# Patient Record
Sex: FEMALE | Race: BLACK OR AFRICAN AMERICAN | Employment: UNEMPLOYED | URBAN - METROPOLITAN AREA
[De-identification: names, ages, dates, MRNs, and addresses within clinical notes are randomized per-mention and may not be internally consistent; named-entity substitution may affect disease eponyms.]

---

## 2017-07-03 ENCOUNTER — OFFICE VISIT (OUTPATIENT)
Dept: OPTOMETRY | Facility: CLINIC | Age: 62
End: 2017-07-03
Payer: COMMERCIAL

## 2017-07-03 ENCOUNTER — OFFICE VISIT (OUTPATIENT)
Dept: FAMILY MEDICINE | Facility: CLINIC | Age: 62
End: 2017-07-03
Payer: COMMERCIAL

## 2017-07-03 VITALS
SYSTOLIC BLOOD PRESSURE: 168 MMHG | OXYGEN SATURATION: 98 % | DIASTOLIC BLOOD PRESSURE: 99 MMHG | BODY MASS INDEX: 28.76 KG/M2 | HEART RATE: 53 BPM | WEIGHT: 137 LBS | TEMPERATURE: 98.6 F | HEIGHT: 58 IN

## 2017-07-03 DIAGNOSIS — R35.0 URINARY FREQUENCY: ICD-10-CM

## 2017-07-03 DIAGNOSIS — I10 ESSENTIAL HYPERTENSION WITH GOAL BLOOD PRESSURE LESS THAN 140/90: Primary | ICD-10-CM

## 2017-07-03 DIAGNOSIS — Z12.11 SCREEN FOR COLON CANCER: ICD-10-CM

## 2017-07-03 DIAGNOSIS — Z11.59 NEED FOR HEPATITIS C SCREENING TEST: ICD-10-CM

## 2017-07-03 DIAGNOSIS — H52.03 HYPEROPIA, BILATERAL: ICD-10-CM

## 2017-07-03 DIAGNOSIS — Z96.1 PSEUDOPHAKIA OF BOTH EYES: ICD-10-CM

## 2017-07-03 DIAGNOSIS — E55.9 VITAMIN D DEFICIENCY: ICD-10-CM

## 2017-07-03 DIAGNOSIS — Z12.4 SCREENING FOR MALIGNANT NEOPLASM OF CERVIX: ICD-10-CM

## 2017-07-03 DIAGNOSIS — H52.4 PRESBYOPIA: ICD-10-CM

## 2017-07-03 DIAGNOSIS — E78.5 HYPERLIPIDEMIA LDL GOAL <130: ICD-10-CM

## 2017-07-03 DIAGNOSIS — H26.491 POSTERIOR CAPSULE OPACIFICATION, RIGHT: ICD-10-CM

## 2017-07-03 DIAGNOSIS — H52.223 REGULAR ASTIGMATISM OF BOTH EYES: ICD-10-CM

## 2017-07-03 DIAGNOSIS — E55.9 VITAMIN D DEFICIENCY DISEASE: ICD-10-CM

## 2017-07-03 DIAGNOSIS — H04.123 DRY EYE SYNDROME, BILATERAL: Primary | ICD-10-CM

## 2017-07-03 DIAGNOSIS — H40.003 GLAUCOMA SUSPECT, BILATERAL: ICD-10-CM

## 2017-07-03 DIAGNOSIS — Z12.31 VISIT FOR SCREENING MAMMOGRAM: ICD-10-CM

## 2017-07-03 DIAGNOSIS — Z00.01 ENCOUNTER FOR ROUTINE ADULT PHYSICAL EXAM WITH ABNORMAL FINDINGS: ICD-10-CM

## 2017-07-03 LAB
ALBUMIN SERPL-MCNC: 3.9 G/DL (ref 3.4–5)
ALBUMIN UR-MCNC: NEGATIVE MG/DL
ANION GAP SERPL CALCULATED.3IONS-SCNC: 9 MMOL/L (ref 3–14)
APPEARANCE UR: CLEAR
BILIRUB UR QL STRIP: NEGATIVE
BUN SERPL-MCNC: 9 MG/DL (ref 7–30)
CALCIUM SERPL-MCNC: 9.2 MG/DL (ref 8.5–10.1)
CHLORIDE SERPL-SCNC: 103 MMOL/L (ref 94–109)
CHOLEST SERPL-MCNC: 295 MG/DL
CO2 SERPL-SCNC: 28 MMOL/L (ref 20–32)
COLOR UR AUTO: YELLOW
CREAT SERPL-MCNC: 0.66 MG/DL (ref 0.52–1.04)
CREAT UR-MCNC: 90 MG/DL
ERYTHROCYTE [DISTWIDTH] IN BLOOD BY AUTOMATED COUNT: 13 % (ref 10–15)
GFR SERPL CREATININE-BSD FRML MDRD: NORMAL ML/MIN/1.7M2
GLUCOSE SERPL-MCNC: 86 MG/DL (ref 70–99)
GLUCOSE UR STRIP-MCNC: NEGATIVE MG/DL
HCT VFR BLD AUTO: 39.1 % (ref 35–47)
HDLC SERPL-MCNC: 69 MG/DL
HGB BLD-MCNC: 13.3 G/DL (ref 11.7–15.7)
HGB UR QL STRIP: NEGATIVE
KETONES UR STRIP-MCNC: NEGATIVE MG/DL
LDLC SERPL CALC-MCNC: 196 MG/DL
LEUKOCYTE ESTERASE UR QL STRIP: NEGATIVE
MCH RBC QN AUTO: 27.9 PG (ref 26.5–33)
MCHC RBC AUTO-ENTMCNC: 34 G/DL (ref 31.5–36.5)
MCV RBC AUTO: 82 FL (ref 78–100)
MICROALBUMIN UR-MCNC: 5 MG/L
MICROALBUMIN/CREAT UR: 5.93 MG/G CR (ref 0–25)
NITRATE UR QL: NEGATIVE
NONHDLC SERPL-MCNC: 226 MG/DL
PH UR STRIP: 8 PH (ref 5–7)
PHOSPHATE SERPL-MCNC: 3 MG/DL (ref 2.5–4.5)
PLATELET # BLD AUTO: 183 10E9/L (ref 150–450)
POTASSIUM SERPL-SCNC: 3.8 MMOL/L (ref 3.4–5.3)
RBC # BLD AUTO: 4.77 10E12/L (ref 3.8–5.2)
SODIUM SERPL-SCNC: 140 MMOL/L (ref 133–144)
SP GR UR STRIP: 1.01 (ref 1–1.03)
TRIGL SERPL-MCNC: 148 MG/DL
URN SPEC COLLECT METH UR: ABNORMAL
UROBILINOGEN UR STRIP-ACNC: 0.2 EU/DL (ref 0.2–1)
WBC # BLD AUTO: 6 10E9/L (ref 4–11)

## 2017-07-03 PROCEDURE — 81003 URINALYSIS AUTO W/O SCOPE: CPT | Performed by: FAMILY MEDICINE

## 2017-07-03 PROCEDURE — 86803 HEPATITIS C AB TEST: CPT | Performed by: FAMILY MEDICINE

## 2017-07-03 PROCEDURE — 92015 DETERMINE REFRACTIVE STATE: CPT | Performed by: OPTOMETRIST

## 2017-07-03 PROCEDURE — 92004 COMPRE OPH EXAM NEW PT 1/>: CPT | Performed by: OPTOMETRIST

## 2017-07-03 PROCEDURE — 85027 COMPLETE CBC AUTOMATED: CPT | Performed by: FAMILY MEDICINE

## 2017-07-03 PROCEDURE — 80069 RENAL FUNCTION PANEL: CPT | Performed by: FAMILY MEDICINE

## 2017-07-03 PROCEDURE — 80061 LIPID PANEL: CPT | Performed by: FAMILY MEDICINE

## 2017-07-03 PROCEDURE — 99396 PREV VISIT EST AGE 40-64: CPT | Performed by: FAMILY MEDICINE

## 2017-07-03 PROCEDURE — 82043 UR ALBUMIN QUANTITATIVE: CPT | Performed by: FAMILY MEDICINE

## 2017-07-03 PROCEDURE — 82306 VITAMIN D 25 HYDROXY: CPT | Performed by: FAMILY MEDICINE

## 2017-07-03 PROCEDURE — 36415 COLL VENOUS BLD VENIPUNCTURE: CPT | Performed by: FAMILY MEDICINE

## 2017-07-03 RX ORDER — OXYBUTYNIN CHLORIDE 10 MG/1
10 TABLET, EXTENDED RELEASE ORAL DAILY
Qty: 90 TABLET | Refills: 3 | Status: SHIPPED | OUTPATIENT
Start: 2017-07-03 | End: 2018-08-07

## 2017-07-03 RX ORDER — ERGOCALCIFEROL 1.25 MG/1
50000 CAPSULE, LIQUID FILLED ORAL
Qty: 8 CAPSULE | Refills: 0 | Status: SHIPPED | OUTPATIENT
Start: 2017-07-03 | End: 2017-09-05

## 2017-07-03 RX ORDER — AMLODIPINE BESYLATE 10 MG/1
10 TABLET ORAL DAILY
Qty: 90 TABLET | Refills: 3 | Status: SHIPPED | OUTPATIENT
Start: 2017-07-03 | End: 2018-08-07

## 2017-07-03 RX ORDER — ATORVASTATIN CALCIUM 40 MG/1
40 TABLET, FILM COATED ORAL DAILY
Qty: 90 TABLET | Refills: 3 | Status: SHIPPED | OUTPATIENT
Start: 2017-07-03 | End: 2018-08-07

## 2017-07-03 ASSESSMENT — TEAR MENISCUS
OS_TEAR_MENISCUS: DECREASED
OD_TEAR_MENISCUS: DECREASED

## 2017-07-03 ASSESSMENT — REFRACTION_MANIFEST
OS_SPHERE: -1.25
OD_ADD: +2.50
OS_AXIS: 175
OS_ADD: +2.50
OS_CYLINDER: +1.75
OD_SPHERE: PLANO
OD_AXIS: 004
OD_CYLINDER: +1.25

## 2017-07-03 ASSESSMENT — SLIT LAMP EXAM - LIDS
COMMENTS: NORMAL
COMMENTS: NORMAL

## 2017-07-03 ASSESSMENT — VISUAL ACUITY
OS_CC: 20/60
CORRECTION_TYPE: GLASSES
OS_CC: 20/50
OD_CC: 20/60
METHOD: SNELLEN - LINEAR
OD_CC: 20/50
OS_SC: 20/50
OD_SC: 20/50

## 2017-07-03 ASSESSMENT — CONF VISUAL FIELD
OD_NORMAL: 1
OS_NORMAL: 1
METHOD: COUNTING FINGERS

## 2017-07-03 ASSESSMENT — PUNCTA - ASSESSMENT
OS_PUNCTA: NORMAL
OD_PUNCTA: NORMAL

## 2017-07-03 ASSESSMENT — PAIN SCALES - GENERAL: PAINLEVEL: NO PAIN (0)

## 2017-07-03 ASSESSMENT — TONOMETRY
OS_IOP_MMHG: UNAB
OD_IOP_MMHG: UNAB
IOP_UNABLETOASSESS: 1
IOP_METHOD: APPLANATION

## 2017-07-03 ASSESSMENT — REFRACTION_WEARINGRX
OS_SPHERE: -1.75
SPECS_TYPE: BIFOCAL
OS_ADD: +2.75
OS_CYLINDER: +1.75
OS_AXIS: 175
OD_SPHERE: -0.50
OD_CYLINDER: +1.00
OD_AXIS: 180
OD_ADD: +2.75

## 2017-07-03 ASSESSMENT — EXTERNAL EXAM - LEFT EYE: OS_EXAM: NORMAL

## 2017-07-03 ASSESSMENT — CUP TO DISC RATIO
OS_RATIO: 0.7
OD_RATIO: 0.8

## 2017-07-03 ASSESSMENT — EXTERNAL EXAM - RIGHT EYE: OD_EXAM: NORMAL

## 2017-07-03 NOTE — MR AVS SNAPSHOT
After Visit Summary   7/3/2017    Allison Kerr    MRN: [id number]           Patient Information     Date Of Birth          1955        Visit Information        Provider Department      7/3/2017 10:40 AM Elena Jorgensen MD [facility]        Today's Diagnoses     Essential hypertension with goal blood pressure less than 140/90    -  1    Screen for colon cancer        Visit for screening mammogram        Screening for malignant neoplasm of cervix        Need for hepatitis C screening test        Hyperlipidemia LDL goal <130        Vitamin D deficiency        Urinary frequency          Care Instructions    How to contact your care team: [phone] Pharmacy [phone]   PATRIZIA ESPINOZA MD KATYA GEORGIEV, PA-C CHRIS JONES, PA-C NAM HO, MD JONATHAN BATES, MD ARVIN VOCAL, MD    Clinic hours M-Th 7am-7pm Fri 7am-5pm.   Urgent care M-F 11am-9pm  Sat/Sun 9am-5pm.   Pharmacy   Mon-Th:  8:00am-8pm   Fri:  8:00am-6:00pm  Sat/Sun  8:00am-5:00 pm   You are due for your annual mammogram. Please call and schedule your appointment at a time and location that is good for you.    [facility] is available for screening mammographs every other Monday 4:15-5:15, Tuesday 8-10:45, Wednesday 11-12, Friday 3:15-4:15, and every other Saturday 9:15-12:30.    [address] [place] [address]  [phone]   24 hour Mammography scheduling  [phone]    [facility] is available M,W, Th 7:15 am to 5 pm, Tuesday 7:15 am to 4 pm, and Fridays 7:15 to 4:30 pm.     [facility]  [address] [place] [address]  [phone]     Preventive Health Recommendations  Female Ages 50 - 64    Yearly exam: See your health care provider every year in order to  o Review health changes.   o Discuss preventive care.    o Review your medicines if your doctor has prescribed  any.      Get a Pap test every three years (unless you have an abnormal result and your provider advises testing more often).    If you get Pap tests with HPV test, you only need to test every 5 years, unless you have an abnormal result.     You do not need a Pap test if your uterus was removed (hysterectomy) and you have not had cancer.    You should be tested each year for STDs (sexually transmitted diseases) if you're at risk.     Have a mammogram every 1 to 2 years.    Have a colonoscopy at age 50, or have a yearly FIT test (stool test). These exams screen for colon cancer.      Have a cholesterol test every 5 years, or more often if advised.    Have a diabetes test (fasting glucose) every three years. If you are at risk for diabetes, you should have this test more often.     If you are at risk for osteoporosis (brittle bone disease), think about having a bone density scan (DEXA).    Shots: Get a flu shot each year. Get a tetanus shot every 10 years.    Nutrition:     Eat at least 5 servings of fruits and vegetables each day.    Eat whole-grain bread, whole-wheat pasta and brown rice instead of white grains and rice.    Talk to your provider about Calcium and Vitamin D.     Lifestyle    Exercise at least 150 minutes a week (30 minutes a day, 5 days a week). This will help you control your weight and prevent disease.    Limit alcohol to one drink per day.    No smoking.     Wear sunscreen to prevent skin cancer.     See your dentist every six months for an exam and cleaning.    See your eye doctor every 1 to 2 years.            Follow-ups after your visit        Follow-up notes from your care team     Return in about 4 weeks (around 7/31/2017) for Pap, Mammogram.      Future tests that were ordered for you today     Open Future Orders        Priority Expected Expires Ordered    MA SCREENING DIGITAL BILAT - Future  (s+30) Routine  7/3/2018 7/3/2017    Fecal colorectal cancer screen FIT - Future (S+30) Routine  "2017 2017 7/3/2017            Who to contact     If you have questions or need follow up information about today's clinic visit or your schedule please contact [facility] SOWMYA PETIT directly at [phone].  Normal or non-critical lab and imaging results will be communicated to you by MyChart, letter or phone within 4 business days after the clinic has received the results. If you do not hear from us within 7 days, please contact the clinic through [employer] or phone. If you have a critical or abnormal lab result, we will notify you by phone as soon as possible.  Submit refill requests through [employer] or call your pharmacy and they will forward the refill request to us. Please allow 3 business days for your refill to be completed.          Additional Information About Your Visit        [employer] Information     [employer] lets you send messages to your doctor, view your test results, renew your prescriptions, schedule appointments and more. To sign up, go to [email or web address] . Click on \"Log in\" on the left side of the screen, which will take you to the Welcome page. Then click on \"Sign up Now\" on the right side of the page.     You will be asked to enter the access code listed below, as well as some personal information. Please follow the directions to create your username and password.     Your access code is: [email or web address]  Expires: 10/1/2017 11:12 AM     Your access code will  in 90 days. If you need help or a new code, please call your [place] clinic or [phone].        Care EveryWhere ID     This is your Care EveryWhere ID. This could be used by other organizations to access your [place] medical records  [id number]        Your Vitals Were     Pulse Temperature Height Pulse Oximetry Breastfeeding? BMI (Body Mass Index)    53 98.6  F (37  C) (Oral) 4' 10.25\" (1.48 m) 98% No 28.39 kg/m2       Blood Pressure from Last 3 Encounters:   17 (!) 168/99   16 152/82   04/29/15 " 152/74    Weight from Last 3 Encounters:   07/03/17 137 lb (62.1 kg)   08/26/16 139 lb 3.2 oz (63.1 kg)   04/29/15 144 lb (65.3 kg)              We Performed the Following     Albumin Random Urine Quantitative     CBC with platelets     Hepatitis C Screen Reflex to HCV RNA Quant and Genotype     Lipid panel reflex to direct LDL     Renal panel     UA reflex to Microscopic and Culture     Vitamin D Deficiency          Today's Medication Changes          These changes are accurate as of: 7/3/17 11:12 AM.  If you have any questions, ask your nurse or doctor.               Start taking these medicines.        Dose/Directions    carboxymethylcellulose 1 % ophthalmic solution   Commonly known as:  REFRESH   Used for:  Dry eye syndrome, bilateral   Started by:  Michael hKan, OD        Dose:  1 drop   Place 1 drop into both eyes 4 times daily   Quantity:  15 each   Refills:  12       oxybutynin 10 MG 24 hr tablet   Commonly known as:  DITROPAN XL   Used for:  Urinary frequency   Started by:  Elena Jorgensen MD        Dose:  10 mg   Take 1 tablet (10 mg) by mouth daily   Quantity:  90 tablet   Refills:  3         Stop taking these medicines if you haven't already. Please contact your care team if you have questions.     fish oil-omega-3 fatty acids 1000 MG capsule   Stopped by:  Elena Jorgensen MD           fluticasone 50 MCG/ACT spray   Commonly known as:  FLONASE   Stopped by:  Elena Jorgensen MD           olopatadine 0.1 % ophthalmic solution   Commonly known as:  PATANOL   Stopped by:  Elena Jorgensen MD                Where to get your medicines      These medications were sent to [facility] [place], [place] - [address]  [address], [facility] [address]     Phone:  [phone]     amLODIPine 10 MG tablet    atorvastatin 40 MG tablet    carboxymethylcellulose 1 % ophthalmic solution    oxybutynin 10 MG 24 hr tablet                Primary Care Provider Office  Phone # Fax #    Elena Mary Kay Jorgensen [phone]       [facility] [address]  [facility] [address]        Equal Access to Services     NARESH BURGOS : Hadii aad ku hadalleno Soninoali, waaxda luqadaha, qaybta kaalmada adeegyada, waxay idiin muniran piotr hendrickson laemory marley. So [facility] [phone].    ATENCIÓN: Si habla español, tiene a joseph disposición servicios gratuitos de asistencia lingüística. Llame al [phone].    We comply with applicable federal civil rights laws and [place] laws. We do not discriminate on the basis of race, color, national origin, age, disability sex, sexual orientation or gender identity.            Thank you!     Thank you for choosing [facility]  for your care. Our goal is always to provide you with excellent care. Hearing back from our patients is one way we can continue to improve our services. Please take a few minutes to complete the written survey that you may receive in the mail after your visit with us. Thank you!             Your Updated Medication List - Protect others around you: Learn how to safely use, store and throw away your medicines at www.disposemymeds.org.          This list is accurate as of: 7/3/17 11:12 AM.  Always use your most recent med list.                   Brand Name Dispense Instructions for use Diagnosis    amLODIPine 10 MG tablet    NORVASC    90 tablet    Take 1 tablet (10 mg) by mouth daily    Essential hypertension with goal blood pressure less than 140/90       atorvastatin 40 MG tablet    LIPITOR    90 tablet    Take 1 tablet (40 mg) by mouth daily    Hyperlipidemia LDL goal <130       carboxymethylcellulose 1 % ophthalmic solution    REFRESH    15 each    Place 1 drop into both eyes 4 times daily    Dry eye syndrome, bilateral       oxybutynin 10 MG 24 hr tablet    DITROPAN XL    90 tablet    Take 1 tablet (10 mg) by mouth daily    Urinary frequency

## 2017-07-03 NOTE — Clinical Note
Bhupendra Ron,  Could you please call Allison to schedule a YAG right eye and also glaucoma workup- IOP/OCT/VF pachymetry.  Thanks!  Michael

## 2017-07-03 NOTE — NURSING NOTE
"Chief Complaint   Patient presents with     Physical     Fasting       Initial BP (!) 168/99 (BP Location: Right arm, Patient Position: Chair, Cuff Size: Adult Regular)  Pulse 53  Temp 98.6  F (37  C) (Oral)  Ht 4' 10.25\" (1.48 m)  Wt 137 lb (62.1 kg)  SpO2 98%  Breastfeeding? No  BMI 28.39 kg/m2 Estimated body mass index is 28.39 kg/(m^2) as calculated from the following:    Height as of this encounter: 4' 10.25\" (1.48 m).    Weight as of this encounter: 137 lb (62.1 kg).  Medication Reconciliation: complete     Dyllan Perales CMA    "

## 2017-07-03 NOTE — LETTER
[facility]  [address] [facility] [address]  [phone]             July 6, 2017    Allison Kerr  [address] [place] [address] [address]   [facility] [address]          Dear Allison Kerr,     Your test results are attached. I am happy to let you know that they are stable and your medications can stay the same.     The blood sugar is normal and you do not have diabetes. The kidneys are healthy. The test for hepatitis C was normal.     The vitamin D test was better but not up to 30 yet where it should be for best bone health. The 50,000 should get this back up, then continue to take 2,000 units of vitamin D3 a day or 10,000 once a week to keep this from going back down again. I think you will feel better. Enclosed are the results.  Results for orders placed or performed in visit on 07/03/17   Hepatitis C Screen Reflex to HCV RNA Quant and Genotype   Result Value Ref Range    Hepatitis C Antibody  NR     Nonreactive   Assay performance characteristics have not been established for newborns,   infants, and children     Vitamin D Deficiency   Result Value Ref Range    Vitamin D Deficiency screening 20 20 - 75 ug/L   Renal panel   Result Value Ref Range    Sodium 140 133 - 144 mmol/L    Potassium 3.8 3.4 - 5.3 mmol/L    Chloride 103 94 - 109 mmol/L    Carbon Dioxide 28 20 - 32 mmol/L    Anion Gap 9 3 - 14 mmol/L    Glucose 86 70 - 99 mg/dL    Urea Nitrogen 9 7 - 30 mg/dL    Creatinine 0.66 0.52 - 1.04 mg/dL    GFR Estimate >90  Non  GFR Calc   >60 mL/min/1.7m2    GFR Estimate If Black >90   GFR Calc   >60 mL/min/1.7m2    Calcium 9.2 8.5 - 10.1 mg/dL    Phosphorus 3.0 2.5 - 4.5 mg/dL    Albumin 3.9 3.4 - 5.0 g/dL   CBC with platelets   Result Value Ref Range    WBC 6.0 4.0 - 11.0 10e9/L    RBC Count 4.77 3.8 - 5.2 10e12/L    Hemoglobin 13.3 11.7 - 15.7 g/dL    Hematocrit 39.1 35.0 - 47.0 %    MCV 82 78 - 100 fl    MCH 27.9 26.5 - 33.0 pg    MCHC 34.0 31.5 -  36.5 g/dL    RDW 13.0 10.0 - 15.0 %    Platelet Count 183 150 - 450 10e9/L   Albumin Random Urine Quantitative   Result Value Ref Range    Creatinine Urine 90 mg/dL    Albumin Urine mg/L 5 mg/L    Albumin Urine mg/g Cr 5.93 0 - 25 mg/g Cr   UA reflex to Microscopic and Culture   Result Value Ref Range    Color Urine Yellow     Appearance Urine Clear     Glucose Urine Negative NEG mg/dL    Bilirubin Urine Negative NEG    Ketones Urine Negative NEG mg/dL    Specific Gravity Urine 1.010 1.003 - 1.035    Blood Urine Negative NEG    pH Urine 8.0 (H) 5.0 - 7.0 pH    Protein Albumin Urine Negative NEG mg/dL    Urobilinogen Urine 0.2 0.2 - 1.0 EU/dL    Nitrite Urine Negative NEG    Leukocyte Esterase Urine Negative NEG    Source Midstream Urine    Lipid panel reflex to direct LDL   Result Value Ref Range    Cholesterol 295 (H) <200 mg/dL    Triglycerides 148 <150 mg/dL    HDL Cholesterol 69 >49 mg/dL    LDL Cholesterol Calculated 196 (H) <100 mg/dL    Non HDL Cholesterol 226 (H) <130 mg/dL   We can recheck labs in 1 year.   Please call me if you have any questions about these test results or about your care.   Sincerely,     Elena Jorgensen MD/ronn

## 2017-07-03 NOTE — PROGRESS NOTES
SUBJECTIVE:   CC: Allison Kerr is an 62 year old woman who presents for preventive health visit.     Healthy Habits:    Do you get at least three servings of calcium containing foods daily (dairy, green leafy vegetables, etc.)? yes    Amount of exercise or daily activities, outside of work: Walk a lot at work    Problems taking medications regularly Yes, recently with insurance so has not been taking her usual medications for a long time    Medication side effects: No    Have you had an eye exam in the past two years? yes    Do you see a dentist twice per year? no    Do you have sleep apnea, excessive snoring or daytime drowsiness?no        Hyperlipidemia Follow-Up      Rate your low fat/cholesterol diet?: good    Taking statin?  Yes, no muscle aches from statin    Other lipid medications/supplements?:  none    Hypertension Follow-up      Outpatient blood pressures are not being checked.    Low Salt Diet: no added salt      Today's PHQ-2 Score:   PHQ-2 ( 1999 Pfizer) 7/3/2017 10/6/2014   Q1: Little interest or pleasure in doing things 0 0   Q2: Feeling down, depressed or hopeless 0 0   PHQ-2 Score 0 0       Abuse: Current or Past(Physical, Sexual or Emotional)- No  Do you feel safe in your environment - Yes    Social History   Substance Use Topics     Smoking status: Never Smoker     Smokeless tobacco: Never Used     Alcohol use No     The patient does not drink >3 drinks per day nor >7 drinks per week.    Reviewed orders with patient.  Reviewed health maintenance and updated orders accordingly - Yes  Labs reviewed in EPIC  BP Readings from Last 3 Encounters:   07/03/17 (!) 168/99   08/26/16 152/82   04/29/15 152/74    Wt Readings from Last 3 Encounters:   07/03/17 137 lb (62.1 kg)   08/26/16 139 lb 3.2 oz (63.1 kg)   04/29/15 144 lb (65.3 kg)                  Patient Active Problem List   Diagnosis     Hyperlipidemia LDL goal <130     Advanced directives, counseling/discussion     Hypertension goal BP (blood  pressure) < 140/90     Vitamin D deficiency     Overweight     Past Surgical History:   Procedure Laterality Date     CATARACT IOL, RT/LT       HC ECP WITH CATARACT SURGERY  2011    both eyes       Social History   Substance Use Topics     Smoking status: Never Smoker     Smokeless tobacco: Never Used     Alcohol use No     Family History   Problem Relation Age of Onset     Hypertension Mother      CEREBROVASCULAR DISEASE Mother      Hypertension Father          Current Outpatient Prescriptions   Medication Sig Dispense Refill     carboxymethylcellulose (REFRESH) 1 % ophthalmic solution Place 1 drop into both eyes 4 times daily 15 each 12     amLODIPine (NORVASC) 10 MG tablet Take 1 tablet (10 mg) by mouth daily 90 tablet 3     atorvastatin (LIPITOR) 40 MG tablet Take 1 tablet (40 mg) by mouth daily 90 tablet 3     oxybutynin (DITROPAN XL) 10 MG 24 hr tablet Take 1 tablet (10 mg) by mouth daily 90 tablet 3     vitamin D (ERGOCALCIFEROL) 63148 UNIT capsule Take 1 capsule (50,000 Units) by mouth every 7 days 8 capsule 0     [DISCONTINUED] amLODIPine (NORVASC) 10 MG tablet Take 1 tablet (10 mg) by mouth daily (Patient not taking: Reported on 7/3/2017) 90 tablet 3     [DISCONTINUED] atorvastatin (LIPITOR) 40 MG tablet Take 1 tablet (40 mg) by mouth daily 30 tablet 2     Allergies   Allergen Reactions     Ace Inhibitors Cough     Lisinopril Cough     Recent Labs   Lab Test  11/08/12   1201   LDL  225*   HDL  59   TRIG  175*   CR  0.82   GFRESTIMATED  71   GFRESTBLACK  86   POTASSIUM  4.2   TSH  1.18        Patient over age 50, mutual decision to screen reflected in health maintenance.    Pertinent mammograms are reviewed under the imaging tab.  History of abnormal Pap smear: NO - age 30-65 PAP every 5 years with negative HPV co-testing recommended    Reviewed and updated as needed this visit by clinical staff  Tobacco  Meds         Reviewed and updated as needed this visit by Provider            ROS:  C: NEGATIVE for  "fever, chills, change in weight  I: NEGATIVE for worrisome rashes, moles or lesions  E: NEGATIVE for vision changes or irritation  ENT: NEGATIVE for ear, mouth and throat problems  R: NEGATIVE for significant cough or SOB  B: NEGATIVE for masses, tenderness or discharge  CV: NEGATIVE for chest pain, palpitations or peripheral edema  GI: NEGATIVE for nausea, abdominal pain, heartburn, or change in bowel habits  : NEGATIVE for unusual urinary or vaginal symptoms. No vaginal bleeding.  M: NEGATIVE for significant arthralgias or myalgia  N: NEGATIVE for weakness, dizziness or paresthesias  P: NEGATIVE for changes in mood or affect     OBJECTIVE:   /80 (BP Location: Right arm, Patient Position: Chair, Cuff Size: Adult Regular)  Pulse 53  Temp 98.6  F (37  C) (Oral)  Ht 4' 10.25\" (1.48 m)  Wt 137 lb (62.1 kg)  SpO2 98%  Breastfeeding? No  BMI 28.39 kg/m2  EXAM:  GENERAL APPEARANCE: healthy, alert and no distress  EYES: Eyes grossly normal to inspection, PERRL and conjunctivae and sclerae normal  HENT: ear canals and TM's normal, nose and mouth without ulcers or lesions, oropharynx clear and oral mucous membranes moist  NECK: no adenopathy, no asymmetry, masses, or scars and thyroid normal to palpation  RESP: lungs clear to auscultation - no rales, rhonchi or wheezes  CV: regular rates and rhythm, normal S1 S2, no S3 or S4, no murmur, click or rub, no peripheral edema and peripheral pulses strong  ABDOMEN: soft, nontender, no hepatosplenomegaly, no masses and bowel sounds normal  MS: no musculoskeletal defects are noted and gait is age appropriate without ataxia  SKIN: no suspicious lesions or rashes  NEURO: Normal strength and tone, sensory exam grossly normal, mentation intact and speech normal  PSYCH: mentation appears normal and affect normal/bright     ASSESSMENT/PLAN:       ICD-10-CM    1. Essential hypertension with goal blood pressure less than 140/90- off medication due to insurance and needs to " "restart.  I10 amLODIPine (NORVASC) 10 MG tablet     Renal panel     CBC with platelets     Albumin Random Urine Quantitative     UA reflex to Microscopic and Culture   2. Screen for colon cancer Z12.11 Fecal colorectal cancer screen FIT - Future (S+30)   3. Visit for screening mammogram Z12.31 MA SCREENING DIGITAL BILAT - Future  (s+30)   4. Screening for malignant neoplasm of cervix Z12.4 Wants to defer this exam today   5. Need for hepatitis C screening test Z11.59 Hepatitis C Screen Reflex to HCV RNA Quant and Genotype   6. Hyperlipidemia LDL goal <130 E78.5 atorvastatin (LIPITOR) 40 MG tablet     Lipid panel reflex to direct LDL   7. Vitamin D deficiency E55.9 Vitamin D Deficiency-recheck for recurrent symptoms off medication    8. Urinary frequency R35.0 oxybutynin (DITROPAN XL) 10 MG 24 hr tablet with incontinence symptoms    9. Vitamin D deficiency disease E55.9 vitamin D (ERGOCALCIFEROL) 92729 UNIT capsule       COUNSELING:   Reviewed preventive health counseling, as reflected in patient instructions       Regular exercise       Healthy diet/nutrition       Osteoporosis Prevention/Bone Health       Consider Hep C screening for patients born between 1945 and 1965         reports that she has never smoked. She has never used smokeless tobacco.    Estimated body mass index is 28.39 kg/(m^2) as calculated from the following:    Height as of this encounter: 4' 10.25\" (1.48 m).    Weight as of this encounter: 137 lb (62.1 kg).   Weight management plan: Discussed healthy diet and exercise guidelines and patient will follow up in 1 month in clinic to re-evaluate.    Counseling Resources:  ATP IV Guidelines  Pooled Cohorts Equation Calculator  Breast Cancer Risk Calculator  FRAX Risk Assessment  ICSI Preventive Guidelines  Dietary Guidelines for Americans, 2010  USDA's MyPlate  ASA Prophylaxis  Lung CA Screening    Elena Jorgensen MD  [facility]  "

## 2017-07-03 NOTE — PATIENT INSTRUCTIONS
Use one drop of artificial tears both eyes 3-4 x daily.  Continue to use the drops regardless if your eyes are comfortable.  Artificial tears work best as a preventative and not as well after your eyes are starting to bother you.  It may take 4- 6 weeks of using the drops before you notice improvement.  If after that time you are still having problems schedule an appointment for an evaluation and discussion of different treatments.  Dry eyes are a chronic condition and you may have more symptoms at certain times of the year.    Referral to Dr. Morrow at [facility] for YAG evaluation and glaucoma evaluation.    Hold eyeglass prescription until after YAG.    Recommend annual eye exams.    Michael Khan, OD    The affects of the dilating drops last for 4- 6 hours.  You will be more sensitive to light and vision will be blurry up close.  Mydriatic sunglasses were given if needed.      Optometry Providers       Clinic Locations                                 Telephone Number   Dr. Daniella Khan   [facility]  [phone]     [facility] Hours:                [facility] Hours:       [facility] Hours:  [address]   [address] [facility] [address]     [address] [place] [address]   [place] [address]    [place] [address]  Phone: [phone]                    Phone [phone]                      Phone: [phone]                          Monday 8:00-7:00                          Monday 8:00-7:00                          Monday 8:00-7:00              Tuesday 8:00-6:00                          Tuesday 8:00-7:00                          Tuesday 8:00-7:00              Wednesday 8:00-6:00                  Wednesday 8:00-7:00                   Wednesday 8:00-7:00      Thursday 8:00-6:00                        Thursday 8:00-7:00                         Thursday 8:00-7:00             Friday 8:00-5:00                              Friday 8:00-5:00                              Friday 8:00-5:00    Please log on to [email or web address] to order your contact lenses.  The link is found on the [facility] page.  As always, Thank you for trusting us with your health care needs!      What Is YAG Capsulotomy?  YAG capsulotomy is a laser eye treatment. It is used to improve your vision after cataract surgery. Your vision can become cloudy again after cataract surgery. This is sometimes called an after cataract. But it isn t a new cataract. Instead, your posterior capsule, which holds the lens in place, becomes cloudy. Your eye doctor may use YAG capsulotomy to help you see better.      Your eye after cataract surgery  When your cataract is removed, your eye s cloudy lens is replaced with a plastic lens called an IOL (intraocular lens). The IOL is placed in the posterior capsule, which held the old cloudy lens. You can see again because the IOL lets light reach your retina. The retina is a tissue layer that lines the back of your eye.  If the capsule becomes cloudy  The posterior capsule is a thin, clear film. It can become cloudy. This can happen months or even years after cataract surgery. This may block light from reaching your retina.  Date Last Reviewed: 6/13/2015 2000-2017 [employer]. [address] [place] [address], [place] [address]. All rights reserved. This information is not intended as a substitute for professional medical care. Always follow your healthcare professional's instructions.        YAG Capsulotomy: How a YAG Laser Works    A laser is a strong beam of energy that can be focused on a tiny point. A laser can be precisely controlled. This makes it safe and reliable.  The YAG laser  For a capsulotomy, your eye doctor uses a YAG laser. The YAG laser gives off fast, tiny bursts of energy. A special contact lens may be used to help focus the laser on the right  "spot. The laser passes through the front of your eye. It also passes through your new IOL (intraocular lens). It doesn t harm them. When the laser reaches your posterior capsule, it makes a tiny opening. Light can then enter your eye again. Your posterior capsule is still able to hold your IOL in place.     Date Last Reviewed: 6/13/2015 2000-2017 [employer]. [address] [place] [address], [place] [address]. All rights reserved. This information is not intended as a substitute for professional medical care. Always follow your healthcare professional's instructions.        What Is Glaucoma?    Glaucoma is an eye disease that can cause blindness. If caught early, it can usually be controlled. But it often has no symptoms, so you need regular eye exams. Glaucoma usually begins when pressure builds up in the eye. This pressure can damage the optic nerve. The optic nerve sends messages to the brain so you can see. There are two main kinds of glaucoma: \"open-angle\" and \"closed-angle.\"  Drainage area  The eye is always producing fluid. The eye's drainage areas may become clogged or blocked. Too much fluid stays in the eye. This increases eye pressure.  Optic nerve  Too much pressure in the eye can damage the optic nerve. If damaged, this nerve cannot send the messages to the brain that let you see.  Open-Angle Glaucoma  Open-angle is the most common kind of glaucoma. It occurs slowly as people age. The drainage area in the eye becomes clogged. Not enough fluid drains from the eye, so pressure slowly builds up. This causes gradual loss of side (peripheral) vision. You may not even notice changes until much of your vision is lost.  Closed-Angle Glaucoma  Closed-angle glaucoma is less common than open-angle. It usually comes on quickly. The drainage area in the eye suddenly becomes completely blocked. Eye pressure builds rapidly. You may notice blurred vision and rainbow halos around lights. You may also have " headaches, nausea, vomiting, and severe pain. If not treated right away, blindness can occur quickly.  Date Last Reviewed: 6/1/2015 2000-2017 [employer]. [address] [place] [address]. All rights reserved. This information is not intended as a substitute for professional medical care. Always follow your healthcare professional's instructions.        What Are Dry Eyes?    Do your eyes ever sting, burn, or feel scratchy? To be comfortable, your eyes need to be bathed, or lubricated, with tears. Normally, there is always a film of tears on the surface of your eyes. But if your eyes don t produce enough tears, the surface gets irritated. This is known as dry eyes.  Not enough lubricating tears  When you cry, or get something in your eye, or have an infection, your eyes make reflex tears. Each time you blink, another kind of tears, called lubricating tears, spread over the surface of your eyes. These tears keep the eyes moist and comfortable. You aren t aware of these tears because they stay on the surface of your eyes.  Without lubricating tears, your eyes get dry. Then they burn or sting and feel scratchy. They may also water. But this doesn t relieve the dryness. That's because the eyes water with reflex tears, not lubricating tears.  What causes dry eyes?    Aging    Heaters and air conditioners    Wind, smoke, or dry weather    Allergies such as hay fever    Medicines    Eyelid problems, injuries to the eye, or diseases like rheumatoid arthritis  How lubricating tears flow  Lubricating tears flow from glands in your upper eyelid over the surface of your eye. From your eye, the tears drain into canals that lead to your nose.  Date Last Reviewed: 6/6/2015 2000-2017 [employer]. [address] [place] [address]. All rights reserved. This information is not intended as a substitute for professional medical care. Always follow your healthcare professional's  instructions.        Treating Dry Eyes    Artificial tears are the most common treatment for dry eyes. If they don t relieve your symptoms, your eye doctor may put in plugs. Or you may have surgery to stop the draining and increase the tear film.  Artificial tears  Artificial tears, or lubricating eye drops, replace your natural lubricating tears. You can buy most lubricating eye drops without a prescription. And you can use them as often as needed. Lubricating eye drops are not the same as eye drops used to relieve redness or itching. Check with your eye doctor or pharmacist to be sure you buy the right drops.  Some lubricating eye drops have chemicals called preservatives. This makes them last longer. Your eyes may be sensitive to these drops. Or you may need to use them often. If so, you may want to buy lubricating eye drops made without preservatives. Your eye doctor may also suggest using a lubricating eye ointment at night.  Medicine  Your doctor may prescribe medicine such as cyclosporine to treat your eye condition. It can help increase your eyes' ability to make tears.  Plugs  Closing the puncta with plugs can help keep the tear film on your eye. The plug acts like a stopper in a sink. It allows only a small amount of tears to drain out of your eye. Your eye doctor may first try short-term (temporary) plugs that dissolve in a few days. If these help, he or she may then put in long-term plugs. Your eyes will be numbed with drops when the plugs are inserted. You shouldn t feel any pain. And you shouldn t feel the plugs once they re in.   Surgery  If artificial tears or plugs don t relieve your dry eyes, surgery may be an option. Your eye doctor may do minor outpatient surgery to narrow or block the openings to the drainage canals. If your dry eyes are caused by eyelid problems, your eye doctor may recommend other kinds of surgery.  Date Last Reviewed: 6/6/2015 2000-2017 [employer]. [address]  [place] [address]. All rights reserved. This information is not intended as a substitute for professional medical care. Always follow your healthcare professional's instructions.

## 2017-07-03 NOTE — MR AVS SNAPSHOT
After Visit Summary   7/3/2017    Allison Kerr    MRN: [id number]           Patient Information     Date Of Birth          1955        Visit Information        Provider Department      7/3/2017 9:40 AM Michael Khan OD [facility]        Today's Diagnoses     Dry eye syndrome, bilateral    -  1    Pseudophakia of both eyes        Posterior capsule opacification, right        Glaucoma suspect, bilateral        Hyperopia, bilateral        Regular astigmatism of both eyes        Presbyopia          Care Instructions    Use one drop of artificial tears both eyes 3-4 x daily.  Continue to use the drops regardless if your eyes are comfortable.  Artificial tears work best as a preventative and not as well after your eyes are starting to bother you.  It may take 4- 6 weeks of using the drops before you notice improvement.  If after that time you are still having problems schedule an appointment for an evaluation and discussion of different treatments.  Dry eyes are a chronic condition and you may have more symptoms at certain times of the year.    Referral to Dr. Morrow at [facility] for YAG evaluation and glaucoma evaluation.    Hold eyeglass prescription until after YAG.    Recommend annual eye exams.    Michael Khan OD    The affects of the dilating drops last for 4- 6 hours.  You will be more sensitive to light and vision will be blurry up close.  Mydriatic sunglasses were given if needed.      Optometry Providers       Clinic Locations                                 Telephone Number   Dr. Daniella Khan   [facility]   [place]  [phone]     [facility] Hours:                [facility] Hours:       [facility] Hours:  [address]   [address]     [address] [place] [address]   [place] [address]    [place]  [address]  Phone: [phone]                    Phone [phone]                      Phone: [phone]                          Monday 8:00-7:00                          Monday 8:00-7:00                          Monday 8:00-7:00              Tuesday 8:00-6:00                          Tuesday 8:00-7:00                          Tuesday 8:00-7:00              Wednesday 8:00-6:00                  Wednesday 8:00-7:00                   Wednesday 8:00-7:00      Thursday 8:00-6:00                        Thursday 8:00-7:00                         Thursday 8:00-7:00            Friday 8:00-5:00                              Friday 8:00-5:00                              Friday 8:00-5:00    Please log on to [email or web address] to order your contact lenses.  The link is found on the Eye Care and Vision Services page.  As always, Thank you for trusting us with your health care needs!      What Is YAG Capsulotomy?  YAG capsulotomy is a laser eye treatment. It is used to improve your vision after cataract surgery. Your vision can become cloudy again after cataract surgery. This is sometimes called an after cataract. But it isn t a new cataract. Instead, your posterior capsule, which holds the lens in place, becomes cloudy. Your eye doctor may use YAG capsulotomy to help you see better.      Your eye after cataract surgery  When your cataract is removed, your eye s cloudy lens is replaced with a plastic lens called an IOL (intraocular lens). The IOL is placed in the posterior capsule, which held the old cloudy lens. You can see again because the IOL lets light reach your retina. The retina is a tissue layer that lines the back of your eye.  If the capsule becomes cloudy  The posterior capsule is a thin, clear film. It can become cloudy. This can happen months or even years after cataract surgery. This may block light from reaching your retina.  Date Last Reviewed: 6/13/2015 2000-2017 [employer]. [address] [place] [address],  "[place] [address]. All rights reserved. This information is not intended as a substitute for professional medical care. Always follow your healthcare professional's instructions.        YAG Capsulotomy: How a YAG Laser Works    A laser is a strong beam of energy that can be focused on a tiny point. A laser can be precisely controlled. This makes it safe and reliable.  The YAG laser  For a capsulotomy, your eye doctor uses a YAG laser. The YAG laser gives off fast, tiny bursts of energy. A special contact lens may be used to help focus the laser on the right spot. The laser passes through the front of your eye. It also passes through your new IOL (intraocular lens). It doesn t harm them. When the laser reaches your posterior capsule, it makes a tiny opening. Light can then enter your eye again. Your posterior capsule is still able to hold your IOL in place.     Date Last Reviewed: 6/13/2015 2000-2017 [employer]. [address] [place] [address], [place] [address]. All rights reserved. This information is not intended as a substitute for professional medical care. Always follow your healthcare professional's instructions.        What Is Glaucoma?    Glaucoma is an eye disease that can cause blindness. If caught early, it can usually be controlled. But it often has no symptoms, so you need regular eye exams. Glaucoma usually begins when pressure builds up in the eye. This pressure can damage the optic nerve. The optic nerve sends messages to the brain so you can see. There are two main kinds of glaucoma: \"open-angle\" and \"closed-angle.\"  Drainage area  The eye is always producing fluid. The eye's drainage areas may become clogged or blocked. Too much fluid stays in the eye. This increases eye pressure.  Optic nerve  Too much pressure in the eye can damage the optic nerve. If damaged, this nerve cannot send the messages to the brain that let you see.  Open-Angle Glaucoma  Open-angle is the most common kind of " glaucoma. It occurs slowly as people age. The drainage area in the eye becomes clogged. Not enough fluid drains from the eye, so pressure slowly builds up. This causes gradual loss of side (peripheral) vision. You may not even notice changes until much of your vision is lost.  Closed-Angle Glaucoma  Closed-angle glaucoma is less common than open-angle. It usually comes on quickly. The drainage area in the eye suddenly becomes completely blocked. Eye pressure builds rapidly. You may notice blurred vision and rainbow halos around lights. You may also have headaches, nausea, vomiting, and severe pain. If not treated right away, blindness can occur quickly.  Date Last Reviewed: 6/1/2015 2000-2017 [employer]. [address] [place] [address], [place] [address]. All rights reserved. This information is not intended as a substitute for professional medical care. Always follow your healthcare professional's instructions.        What Are Dry Eyes?    Do your eyes ever sting, burn, or feel scratchy? To be comfortable, your eyes need to be bathed, or lubricated, with tears. Normally, there is always a film of tears on the surface of your eyes. But if your eyes don t produce enough tears, the surface gets irritated. This is known as dry eyes.  Not enough lubricating tears  When you cry, or get something in your eye, or have an infection, your eyes make reflex tears. Each time you blink, another kind of tears, called lubricating tears, spread over the surface of your eyes. These tears keep the eyes moist and comfortable. You aren t aware of these tears because they stay on the surface of your eyes.  Without lubricating tears, your eyes get dry. Then they burn or sting and feel scratchy. They may also water. But this doesn t relieve the dryness. That's because the eyes water with reflex tears, not lubricating tears.  What causes dry eyes?    Aging    Heaters and air conditioners    Wind, smoke, or dry weather    Allergies  such as hay fever    Medicines    Eyelid problems, injuries to the eye, or diseases like rheumatoid arthritis  How lubricating tears flow  Lubricating tears flow from glands in your upper eyelid over the surface of your eye. From your eye, the tears drain into canals that lead to your nose.  Date Last Reviewed: 6/6/2015 2000-2017 [employer]. [address] [place] [address], [place] [address]. All rights reserved. This information is not intended as a substitute for professional medical care. Always follow your healthcare professional's instructions.        Treating Dry Eyes    Artificial tears are the most common treatment for dry eyes. If they don t relieve your symptoms, your eye doctor may put in plugs. Or you may have surgery to stop the draining and increase the tear film.  Artificial tears  Artificial tears, or lubricating eye drops, replace your natural lubricating tears. You can buy most lubricating eye drops without a prescription. And you can use them as often as needed. Lubricating eye drops are not the same as eye drops used to relieve redness or itching. Check with your eye doctor or pharmacist to be sure you buy the right drops.  Some lubricating eye drops have chemicals called preservatives. This makes them last longer. Your eyes may be sensitive to these drops. Or you may need to use them often. If so, you may want to buy lubricating eye drops made without preservatives. Your eye doctor may also suggest using a lubricating eye ointment at night.  Medicine  Your doctor may prescribe medicine such as cyclosporine to treat your eye condition. It can help increase your eyes' ability to make tears.  Plugs  Closing the puncta with plugs can help keep the tear film on your eye. The plug acts like a stopper in a sink. It allows only a small amount of tears to drain out of your eye. Your eye doctor may first try short-term (temporary) plugs that dissolve in a few days. If these help, he or she may  then put in long-term plugs. Your eyes will be numbed with drops when the plugs are inserted. You shouldn t feel any pain. And you shouldn t feel the plugs once they re in.   Surgery  If artificial tears or plugs don t relieve your dry eyes, surgery may be an option. Your eye doctor may do minor outpatient surgery to narrow or block the openings to the drainage canals. If your dry eyes are caused by eyelid problems, your eye doctor may recommend other kinds of surgery.  Date Last Reviewed: 6/6/2015 2000-2017 [employer]. [address] [place] [address]. All rights reserved. This information is not intended as a substitute for professional medical care. Always follow your healthcare professional's instructions.                Follow-ups after your visit        Additional Services     OPHTHALMOLOGY ADULT REFERRAL       Your provider has referred you to:  [facility]: [facility] [phone]   [email or web address]      Please be aware that coverage of these services is subject to the terms and limitations of your health insurance plan.  Call member services at your health plan with any benefit or coverage questions.      Please bring the following to your appointment:  >>   Any x-rays, CTs or MRIs which have been performed.  Contact the facility where they were done to arrange for  prior to your scheduled appointment.  Any new CT, MRI or other procedures ordered by your specialist must be performed at a [facility] facility or coordinated by your clinic's referral office.    >>   List of current medications   >>   This referral request   >>   Any documents/labs given to you for this referral                  Follow-up notes from your care team     Return in about 1 year (around 7/3/2018), or if symptoms worsen or fail to improve, for Annual Visit.      Future tests that were ordered for you today     Open Future  "Orders        Priority Expected Expires Ordered    MA SCREENING DIGITAL BILAT - Future  (s+30) Routine  7/3/2018 7/3/2017    Fecal colorectal cancer screen FIT - Future (S+30) Routine 2017 2017 7/3/2017            Who to contact     If you have questions or need follow up information about today's clinic visit or your schedule please contact [facility] directly at [phone].  Normal or non-critical lab and imaging results will be communicated to you by [employer], letter or phone within 4 business days after the clinic has received the results. If you do not hear from us within 7 days, please contact the clinic through [employer] or phone. If you have a critical or abnormal lab result, we will notify you by phone as soon as possible.  Submit refill requests through [employer] or call your pharmacy and they will forward the refill request to us. Please allow 3 business days for your refill to be completed.          Additional Information About Your Visit        [employer] Information     [employer] lets you send messages to your doctor, view your test results, renew your prescriptions, schedule appointments and more. To sign up, go to [email or web address] . Click on \"Log in\" on the left side of the screen, which will take you to the Welcome page. Then click on \"Sign up Now\" on the right side of the page.     You will be asked to enter the access code listed below, as well as some personal information. Please follow the directions to create your username and password.     Your access code is: [email or web address]  Expires: 10/1/2017 11:12 AM     Your access code will  in 90 days. If you need help or a new code, please call your [facility] clinic or [phone].        Care EveryWhere ID     This is your Care EveryWhere ID. This could be used by other organizations to access your [facility] medical records  [id number]         Blood Pressure from Last 3 Encounters:   17 (!) 168/99   16 " 152/82   04/29/15 152/74    Weight from Last 3 Encounters:   07/03/17 62.1 kg (137 lb)   08/26/16 63.1 kg (139 lb 3.2 oz)   04/29/15 65.3 kg (144 lb)              We Performed the Following     EYE EXAM (SIMPLE-NONBILLABLE)     OPHTHALMOLOGY ADULT REFERRAL     REFRACTION          Today's Medication Changes          These changes are accurate as of: 7/3/17 11:46 AM.  If you have any questions, ask your nurse or doctor.               Start taking these medicines.        Dose/Directions    carboxymethylcellulose 1 % ophthalmic solution   Commonly known as:  REFRESH   Used for:  Dry eye syndrome, bilateral   Started by:  Michael Khan OD        Dose:  1 drop   Place 1 drop into both eyes 4 times daily   Quantity:  15 each   Refills:  12       oxybutynin 10 MG 24 hr tablet   Commonly known as:  DITROPAN XL   Used for:  Urinary frequency   Started by:  Elena Jorgensen MD        Dose:  10 mg   Take 1 tablet (10 mg) by mouth daily   Quantity:  90 tablet   Refills:  3       vitamin D 81176 UNIT capsule   Commonly known as:  ERGOCALCIFEROL   Used for:  Vitamin D deficiency disease   Started by:  Elena Jorgensen MD        Dose:  31127 Units   Take 1 capsule (50,000 Units) by mouth every 7 days   Quantity:  8 capsule   Refills:  0         Stop taking these medicines if you haven't already. Please contact your care team if you have questions.     fish oil-omega-3 fatty acids 1000 MG capsule   Stopped by:  Elena Jorgensen MD           fluticasone 50 MCG/ACT spray   Commonly known as:  FLONASE   Stopped by:  Elena Jorgensen MD           olopatadine 0.1 % ophthalmic solution   Commonly known as:  PATANOL   Stopped by:  Elena Jorgensen MD                Where to get your medicines      These medications were sent to [facility] [place] - [address]  [address], [place] [address]     Phone:  [phone]     amLODIPine 10 MG tablet    atorvastatin 40 MG tablet     carboxymethylcellulose 1 % ophthalmic solution    oxybutynin 10 MG 24 hr tablet    vitamin D 73514 UNIT capsule                Primary Care Provider Office Phone # Fax #    Elena Mary Kay Jorgensen [phone]       [facility] [address] [facility] [address]        Equal Access to Services     NARESH BURGOS : Hadii aad ku hadasho Soomaali, waaxda luqadaha, qaybta kaalmada adeegyada, waxay idiin hayaan adeeg kharash la'aan . So [facility] [phone].    ATENCIÓN: Si habla español, tiene a joseph disposición servicios gratuitos de asistencia lingüística. Margoame al [phone].    We comply with applicable federal civil rights laws and [place] laws. We do not discriminate on the basis of race, color, national origin, age, disability sex, sexual orientation or gender identity.            Thank you!     Thank you for choosing [facility]  for your care. Our goal is always to provide you with excellent care. Hearing back from our patients is one way we can continue to improve our services. Please take a few minutes to complete the written survey that you may receive in the mail after your visit with us. Thank you!             Your Updated Medication List - Protect others around you: Learn how to safely use, store and throw away your medicines at www.disposemymeds.org.          This list is accurate as of: 7/3/17 11:46 AM.  Always use your most recent med list.                   Brand Name Dispense Instructions for use Diagnosis    amLODIPine 10 MG tablet    NORVASC    90 tablet    Take 1 tablet (10 mg) by mouth daily    Essential hypertension with goal blood pressure less than 140/90       atorvastatin 40 MG tablet    LIPITOR    90 tablet    Take 1 tablet (40 mg) by mouth daily    Hyperlipidemia LDL goal <130       carboxymethylcellulose 1 % ophthalmic solution    REFRESH    15 each    Place 1 drop into both eyes 4 times daily    Dry eye syndrome, bilateral        oxybutynin 10 MG 24 hr tablet    DITROPAN XL    90 tablet    Take 1 tablet (10 mg) by mouth daily    Urinary frequency       vitamin D 50057 UNIT capsule    ERGOCALCIFEROL    8 capsule    Take 1 capsule (50,000 Units) by mouth every 7 days    Vitamin D deficiency disease

## 2017-07-03 NOTE — PROGRESS NOTES
Chief Complaint   Patient presents with     COMPREHENSIVE EYE EXAM         Last Eye Exam: 3-4 years  Dilated Previously: Yes    What are you currently using to see?  glasses       Distance Vision Acuity: Satisfied with vision    Near Vision Acuity: Satisfied with vision while reading  with glasses    Eye Comfort: good,os eye burt x 1+ year  Do you use eye drops? : No  Occupation or Hobbies: sub teacher    Anisha Wilson Optometric Assistant, A[employer]          Medical, surgical and family histories reviewed and updated 7/3/2017.       OBJECTIVE: See Ophthalmology exam    ASSESSMENT:    ICD-10-CM    1. Dry eye syndrome, bilateral H04.123 carboxymethylcellulose (REFRESH) 1 % ophthalmic solution     EYE EXAM (SIMPLE-NONBILLABLE)     DISCONTINUED: carboxymethylcellulose (REFRESH) 1 % ophthalmic solution   2. Pseudophakia of both eyes Z96.1 EYE EXAM (SIMPLE-NONBILLABLE)   3. Posterior capsule opacification, right H26.491 EYE EXAM (SIMPLE-NONBILLABLE)     OPHTHALMOLOGY ADULT REFERRAL   4. Glaucoma suspect, bilateral H40.003 EYE EXAM (SIMPLE-NONBILLABLE)     OPHTHALMOLOGY ADULT REFERRAL   5. Hyperopia, bilateral H52.03 REFRACTION   6. Regular astigmatism of both eyes H52.223 REFRACTION   7. Presbyopia H52.4 REFRACTION      PLAN:     Patient Instructions   Use one drop of artificial tears both eyes 3-4 x daily.  Continue to use the drops regardless if your eyes are comfortable.  Artificial tears work best as a preventative and not as well after your eyes are starting to bother you.  It may take 4- 6 weeks of using the drops before you notice improvement.  If after that time you are still having problems schedule an appointment for an evaluation and discussion of different treatments.  Dry eyes are a chronic condition and you may have more symptoms at certain times of the year.    Referral to Dr. Morrow at [facility] for YAG evaluation and glaucoma evaluation.    Hold eyeglass prescription until after  YAG.    Recommend annual eye exams.    Michael Khan, OD

## 2017-07-05 LAB
DEPRECATED CALCIDIOL+CALCIFEROL SERPL-MC: 20 UG/L (ref 20–75)
HCV AB SERPL QL IA: NORMAL

## 2017-07-05 NOTE — PROGRESS NOTES
Dear Allison Kerr,    Your test results are attached. I am happy to let you know that they are stable and your medications can stay the same.    The blood sugar is normal and you do not have diabetes. The kidneys are healthy. The test for hepatitis C was normal.     The vitamin D test was better but not up to 30 yet where it should be for best bone health. The 50,000 should get this back up, then continue to take 2,000 units of vitamin D3 a day or 10,000 once a week to keep this from going back down again. I think you will feel better.     We can recheck labs in 1 year.     Please call me if you have any questions about these test results or about your care.    Sincerely,    Elena Jorgensen MD

## 2017-07-10 DIAGNOSIS — Z12.11 SCREEN FOR COLON CANCER: ICD-10-CM

## 2017-07-10 LAB — HEMOCCULT STL QL IA: NEGATIVE

## 2017-07-10 PROCEDURE — 82274 ASSAY TEST FOR BLOOD FECAL: CPT | Performed by: FAMILY MEDICINE

## 2017-07-10 NOTE — LETTER
[address] [address]  [phone]             July 12, 2017    Allison Kerr  [address] [place] [address] [address]   [facility] [address]            Dear Allison Kerr,     The lab results from the most recent visit are all normal or in a good range.     There was no blood in the stool. Recheck in 1 year. Enclosed are the results.  Results for orders placed or performed in visit on 07/10/17   Fecal colorectal cancer screen FIT - Future (S+30)   Result Value Ref Range    Occult Blood Scn FIT Negative NEG       Please call me if you have any questions about your condition or care.     Sincerely,     Elena Jorgensen MD/ronn

## 2017-07-12 NOTE — PROGRESS NOTES
Dear Allison Kerr,    The lab results from the most recent visit are all normal or in a good range.     There was no blood in the stool. Recheck in 1 year.    Please call me if you have any questions about your condition or care.     Sincerely,    Elena Jorgensen MD

## 2017-08-14 ENCOUNTER — OFFICE VISIT (OUTPATIENT)
Dept: OPHTHALMOLOGY | Facility: CLINIC | Age: 62
End: 2017-08-14
Attending: OPTOMETRIST
Payer: COMMERCIAL

## 2017-08-14 DIAGNOSIS — H26.491 PCO (POSTERIOR CAPSULAR OPACIFICATION), RIGHT: ICD-10-CM

## 2017-08-14 DIAGNOSIS — H40.003 GLAUCOMA SUSPECT, BILATERAL: Primary | ICD-10-CM

## 2017-08-14 PROCEDURE — 66821 AFTER CATARACT LASER SURGERY: CPT | Mod: RT | Performed by: OPHTHALMOLOGY

## 2017-08-14 PROCEDURE — 99204 OFFICE O/P NEW MOD 45 MIN: CPT | Mod: 25 | Performed by: OPHTHALMOLOGY

## 2017-08-14 PROCEDURE — 76514 ECHO EXAM OF EYE THICKNESS: CPT | Performed by: OPHTHALMOLOGY

## 2017-08-14 PROCEDURE — 92133 CPTRZD OPH DX IMG PST SGM ON: CPT | Performed by: OPHTHALMOLOGY

## 2017-08-14 RX ORDER — LATANOPROST 50 UG/ML
1 SOLUTION/ DROPS OPHTHALMIC AT BEDTIME
Qty: 1 BOTTLE | Refills: 3 | Status: SHIPPED | OUTPATIENT
Start: 2017-08-14

## 2017-08-14 ASSESSMENT — SLIT LAMP EXAM - LIDS
COMMENTS: NORMAL
COMMENTS: NORMAL

## 2017-08-14 ASSESSMENT — PACHYMETRY
OD_CT(UM): 523
OS_CT(UM): 526

## 2017-08-14 ASSESSMENT — VISUAL ACUITY
OD_CC: 20/60
OD_CC: 20/50
OS_CC: 20/50
OS_CC: 20/60
CORRECTION_TYPE: GLASSES
METHOD: SNELLEN - LINEAR

## 2017-08-14 ASSESSMENT — PUNCTA - ASSESSMENT
OS_PUNCTA: NORMAL
OD_PUNCTA: NORMAL

## 2017-08-14 ASSESSMENT — TEAR MENISCUS
OS_TEAR_MENISCUS: DECREASED
OD_TEAR_MENISCUS: DECREASED

## 2017-08-14 ASSESSMENT — EXTERNAL EXAM - LEFT EYE: OS_EXAM: NORMAL

## 2017-08-14 ASSESSMENT — TONOMETRY
OS_IOP_MMHG: 21
OD_IOP_MMHG: 23
IOP_METHOD: TONOPEN

## 2017-08-14 ASSESSMENT — CUP TO DISC RATIO
OD_RATIO: 0.8
OS_RATIO: 0.7

## 2017-08-14 ASSESSMENT — EXTERNAL EXAM - RIGHT EYE: OD_EXAM: NORMAL

## 2017-08-14 NOTE — LETTER
8/14/2017       RE: Allison Kerr  [address] [place] [address] [place]   [facility] [address]     Dear Colleague,    Thank you for referring your patient, Allison Kerr, to the [facility] at [facility]. Please see a copy of my visit note below.    Assessment & Plan   Allison Kerr is a 62 year old female who presents with:   Review of systems for the eyes was negative other than the pertinent positives and negatives noted in the HPI.    Glaucoma suspect  - OCT Optic Nerve RNFL Optovue OU (both eyes)  - US OPHTHALMIC DIAG, PACHYMETRY  - latanoprost (XALATAN) 0.005 % ophthalmic solution; Place 1 drop into both eyes At Bedtime    PCO (posterior capsular opacification), right  - YAG Capsulotomy OD (right eye)      Return in 2 wks for post op check and IOP check and HVF    Documentation for today's encounter was performed by Ariadne Ballard [employer]. OSC. Acting as a scribe in my presence. I have reviewed and verified that it is an accurate recording of today's encounter.    Attending Physician Attestation:  Complete documentation of historical and exam elements from today's encounter can be found in the full encounter summary report (not reduplicated in this progress note).  I personally obtained the chief complaint(s) and history of present illness.  I confirmed and edited as necessary the review of systems, past medical/surgical history, family history, social history, and examination findings as documented by others; and I examined the patient myself.  I personally reviewed the relevant tests, images, and reports as documented above.  I formulated and edited as necessary the assessment and plan and discussed the findings and management plan with the patient and family. - Gume Morrow MD      Again, thank you for allowing me to participate in the care of your patient.      Sincerely,    Gume Morrow MD

## 2017-08-14 NOTE — PROGRESS NOTES
Assessment & Plan   Allison Kerr is a 62 year old female who presents with:   Review of systems for the eyes was negative other than the pertinent positives and negatives noted in the HPI.    Glaucoma suspect  - OCT Optic Nerve RNFL Optovue OU (both eyes)  - US OPHTHALMIC DIAG, PACHYMETRY  - latanoprost (XALATAN) 0.005 % ophthalmic solution; Place 1 drop into both eyes At Bedtime    PCO (posterior capsular opacification), right  - YAG Capsulotomy OD (right eye)      Return in 2 wks for post op check and IOP check     Documentation for today's encounter was performed by Ariadne Ballard COA. OSC. Acting as a scribe in my presence. I have reviewed and verified that it is an accurate recording of today's encounter.    Attending Physician Attestation:  Complete documentation of historical and exam elements from today's encounter can be found in the full encounter summary report (not reduplicated in this progress note).  I personally obtained the chief complaint(s) and history of present illness.  I confirmed and edited as necessary the review of systems, past medical/surgical history, family history, social history, and examination findings as documented by others; and I examined the patient myself.  I personally reviewed the relevant tests, images, and reports as documented above.  I formulated and edited as necessary the assessment and plan and discussed the findings and management plan with the patient and family. - Gume Morrow MD

## 2017-08-14 NOTE — NURSING NOTE
Patient presents with:  Yag Laser: right eye  Glaucoma Evaluation: OCT and IOP      Referring Provider:  [place]  [address] [place] [address]    HPI    Affected eye(s):  Right   Symptoms:     Blurred vision            Comments:     Yag Laser: right eye  Glaucoma Evaluation: OCT and IOP               Ariadne WATSON. OSC

## 2017-08-14 NOTE — MR AVS SNAPSHOT
After Visit Summary   8/14/2017    Allison Kerr    MRN: [id number]           Patient Information     Date Of Birth          1955        Visit Information        Provider Department      8/14/2017 10:30 AM Gume Morrow MD; [facility] OPHTH NURSE ONLY [facility]        Today's Diagnoses     Glaucoma suspect    -  1      Care Instructions    You have glaucoma start using Latanoprost 1 drop in both eyes at bedtime. If you run out refill it do not stop using the eye drop. This will help lower the pressure in your eyes    YAG Capsulotomy/Iridotomy Laser Surgery    Postoperative Instructions    Postoperative Medications: After surgery, you will use one eye drop for seven days. Which you will start these eye drops on the day of surgery. Your first dose will be given in the clinic prior to you leaving.  Prednisolone - is a steroid eye drop, used to minimize inflammation and modulate healing. It should be used 4 times daily for 1 week. It is a suspension so you will need to shake it before use.  (Name brands for Prednisilone include: Pred Forte, Omnipred,  Econopred, Durezol)  A convenient way of remembering the drops is to use them at Breakfast, Lunch, Dinner,and Bedtime.  The drops might sting a little when they are instilled, and that is normal.  Please continue any glaucoma, dry eye, or other medications you were using prior to the surgery. Wait 1-2 minutes between eye drops.  Please allow 24 to 48 hours when requesting refills, and call BEFORE you run out of  drops.  Restriction on Activities:  - You may develop a slight headache following the treatment. If desired, a pain reliever such as Ibuprofen, Advil, or Motrin may be used.  - It is fine to bathe, read, watch TV, and use the computer.  Symptoms requiring medical attention:  - Sudden onset of increased flashes and/or floaters beyond what you had prior to  leaving the surgery center.  - Persistent or increasing pain in the  eye  - Sudden decrease in vision  - Nausea or vomiting  If you have any questions or concerns before or after your surgery, please contact  Dr. Morrow s office at [phone]    What Is Glaucoma?   Glaucoma is an eye disease that can cause blindness. If caught early, it can usually be controlled. But it often has no symptoms, so you need regular eye exams. Glaucoma usually begins when pressure builds up in the eye. This pressure can damage the optic nerve. The optic nerve sends messages to the brain so you can see. There are two main kinds of glaucoma:  open-angle  and  closed-angle.   Drainage area  The eye is always producing fluid. The eye s drainage areas may become clogged or blocked. Too much fluid stays in the eye. This increases eye pressure.  Optic nerve  Too much pressure in the eye can damage the optic nerve. If damaged, this nerve cannot send the messages to the brain that let you see.  Open-Angle Glaucoma  Open-angle is the most common kind of glaucoma. It occurs slowly as people age. The drainage area in the eye becomes clogged. Not enough fluid drains from the eye, so pressure slowly builds up. This causes gradual loss of side (peripheral) vision. You may not even notice changes until much of your vision is lost.   Closed-Angle Glaucoma  Closed-angle glaucoma is less common than open-angle. It usually comes on quickly. The drainage area in the eye suddenly becomes completely blocked. Eye pressure builds rapidly. You may notice blurred vision and rainbow halos around lights. You may also have headaches, nausea, vomiting, and severe pain. If not treated right away, blindness can occur quickly.    4079-1371 [facility], [address] [place] [address]. All rights reserved. This information is not intended as a substitute for professional medical care. Always follow your healthcare professional's instructions.  Latanoprost Ophthalmic drops, solution  What is this medicine?  LATANOPROST  (la TA sophia prost) is used in the eye to treat open angle glaucoma and high pressure in the eye.  This medicine may be used for other purposes; ask your health care provider or pharmacist if you have questions.  What should I tell my health care provider before I take this medicine?  They need to know if you have any of these conditions:    closed angle glaucoma    eye injury, infection, or swelling    wear contact lenses    an unusual or allergic reaction to latanoprost or other medicines, foods, dyes, or preservatives    pregnant or trying to get pregnant    breast-feeding  How should I use this medicine?  This medicine is only for use in the eye. Do not take by mouth. Follow the directions on the prescription label. Wash hands before and after use. Tilt the head back slightly and pull down the lower lid with the index finger to form a pouch. Try not to touch the tip of the dropper to your eye or into the pouch. Squeeze the prescribed number of drops into the pouch and gently close the eyes for 1 to 2 minutes. Do not blink. Use your doses at regular intervals. Do not use you medicine more often than directed. If you are using another eye product, wait at least 5 minutes between use of this medicine and the other eye product.  Talk to your pediatrician regarding the use of this medicine in children. Special care may be needed.  Overdosage: If you think you have taken too much of this medicine contact a poison control center or emergency room at once.  NOTE: This medicine is only for you. Do not share this medicine with others.  What if I miss a dose?  If you miss a dose, use it as soon as you can. If it is almost time for your next dose, use only that dose. Do not use double or extra doses.  What may interact with this medicine?    bromfenac    eye drops that contain thimerosal (a preservative)  This list may not describe all possible interactions. Give your health care provider a list of all the medicines, herbs,  non-prescription drugs, or dietary supplements you use. Also tell them if you smoke, drink alcohol, or use illegal drugs. Some items may interact with your medicine.  What should I watch for while using this medicine?  Visit your doctor or health care professional for regular checks on your progress. Report any serious side effects right away. Stop using this medicine if your eyes get swollen, painful, or have a discharge, and see your doctor or health care professional as soon as you can.  This medicine may cause your eye, eyelashes, and eyelids to change color. Tell your doctor or health care professional if this happens. If only one eye is being treated with with this medicine, a difference may develop between the treated and untreated eye in eyelash length, darkness or thickness, and/or color changes of the eyelid skin or iris. These changes may be permanent.  If you wear contact lenses, take them out before placing drops in the eye. Contact lenses may be put back in 15 minutes after putting the drops in your eyes.  Wear dark glasses if this medicine makes your eyes more sensitive to light.  What side effects may I notice from receiving this medicine?  Side effects that you should report to your doctor or health care professional as soon as possible:    allergic reactions like skin rash, itching or hives, swelling of the face, lips, or tongue    changes in vision    redness, blistering, peeling or loosening of the skin, including inside the mouth    swollen or infected eyes or eyelids  Side effects that usually do not require medical attention (report to your doctor or health care professional if they continue or are bothersome):    burning, stinging, or discomfort immediately after using the solution    changes in eye, eyelash, or eyelid color    dry eyes    increased flow of tears    sensitivity of the eyes to light  This list may not describe all possible side effects. Call your doctor for medical advice  about side effects. You may report side effects to FDA at 7-975-XSZ-7880.  Where should I keep my medicine?  Keep out of the reach of children.  Store unopened bottle in the refrigerator at 2 to 8 degrees C (36 to 46 degrees F). Once opened, the container may be stored at room temperature up to 25 degrees C (77 degrees F) for up to 6 weeks. Protect from light. Throw away any unused medicine after the expiration date.  NOTE:This sheet is a summary. It may not cover all possible information. If you have questions about this medicine, talk to your doctor, pharmacist, or health care provider. Copyright  2012 Gold Standard            Follow-ups after your visit        Your next 10 appointments already scheduled     Aug 28, 2017  1:45 PM CDT   [facility] with Gume [facility]row MD   [facility]    [id number] [place] [id number] [phone]              Who to contact     If you have questions or need follow up information about today's clinic visit or your schedule please contact [facility] directly at [phone].  Normal or non-critical lab and imaging results will be communicated to you by Orthohubhart, letter or phone within 4 business days after the clinic has received the results. If you do not hear from us within 7 days, please contact the clinic through Orthohubhart or phone. If you have a critical or abnormal lab result, we will notify you by phone as soon as possible.  Submit refill requests through Kira Talent or call your pharmacy and they will forward the refill request to us. Please allow 3 business days for your refill to be completed.          Additional Information About Your Visit        Kira Talent Information     Kira Talent is an electronic gateway that provides easy, online access to your medical records. With Kira Talent, you can request a clinic appointment, read your test results, renew a prescription or communicate with your care  team.     To sign up for Surfkitchent visit the website at [email or web address]   You will be asked to enter the access code listed below, as well as some personal information. Please follow the directions to create your username and password.     Your access code is: [email or web address]  Expires: 10/1/2017 11:12 AM     Your access code will  in 90 days. If you need help or a new code, please contact your [facility] or call [phone] for assistance.        Care EveryWhere ID     This is your Care EveryWhere ID. This could be used by other organizations to access your [facility] medical records  [id number]         Blood Pressure from Last 3 Encounters:   17 (!) 168/99   16 152/82   04/29/15 152/74    Weight from Last 3 Encounters:   17 62.1 kg (137 lb)   16 63.1 kg (139 lb 3.2 oz)   04/29/15 65.3 kg (144 lb)              We Performed the Following     OCT Optic Nerve RNFL Optovue OU (both eyes)        Primary Care Provider Office Phone # Fax #    Elena Mary Kay Jorgensen [phone]       [address]  [facility] [address]        Equal Access to Services     [facility] : Hadii aad ku hadasho Soomaali, waaxda luqadaha, qaybta kaalmada adeegyada, waxay idiin hayaan piotr khallan rosas . So [facility] [phone].    ATENCIÓN: Si habla español, tiene a joseph disposición servicios gratuitos de asistencia lingüística. Llame al [phone].    We comply with applicable federal civil rights laws and [place] laws. We do not discriminate on the basis of race, color, national origin, age, disability sex, sexual orientation or gender identity.            Thank you!     Thank you for choosing [facility]  for your care. Our goal is always to provide you with excellent care. Hearing back from our patients is one way we can continue to improve our services. Please take a few minutes to complete the written survey that you may receive in  the mail after your visit with us. Thank you!             Your Updated Medication List - Protect others around you: Learn how to safely use, store and throw away your medicines at www.disposemymeds.org.          This list is accurate as of: 8/14/17 11:27 AM.  Always use your most recent med list.                   Brand Name Dispense Instructions for use Diagnosis    amLODIPine 10 MG tablet    NORVASC    90 tablet    Take 1 tablet (10 mg) by mouth daily    Essential hypertension with goal blood pressure less than 140/90       atorvastatin 40 MG tablet    LIPITOR    90 tablet    Take 1 tablet (40 mg) by mouth daily    Hyperlipidemia LDL goal <130       carboxymethylcellulose 1 % ophthalmic solution    REFRESH    15 each    Place 1 drop into both eyes 4 times daily    Dry eye syndrome, bilateral       oxybutynin 10 MG 24 hr tablet    DITROPAN XL    90 tablet    Take 1 tablet (10 mg) by mouth daily    Urinary frequency       vitamin D 80108 UNIT capsule    ERGOCALCIFEROL    8 capsule    Take 1 capsule (50,000 Units) by mouth every 7 days    Vitamin D deficiency disease

## 2017-08-14 NOTE — PATIENT INSTRUCTIONS
You have glaucoma start using Latanoprost 1 drop in both eyes at bedtime. If you run out refill it do not stop using the eye drop. This will help lower the pressure in your eyes    YAG Capsulotomy/Iridotomy Laser Surgery    Postoperative Instructions    Postoperative Medications: After surgery, you will use one eye drop for seven days. Which you will start these eye drops on the day of surgery. Your first dose will be given in the clinic prior to you leaving.  Prednisolone - is a steroid eye drop, used to minimize inflammation and modulate healing. It should be used 4 times daily for 1 week. It is a suspension so you will need to shake it before use.  (Name brands for Prednisilone include: Pred Forte, Omnipred,  Econopred, Durezol)  A convenient way of remembering the drops is to use them at Breakfast, Lunch, Dinner,and Bedtime.  The drops might sting a little when they are instilled, and that is normal.  Please continue any glaucoma, dry eye, or other medications you were using prior to the surgery. Wait 1-2 minutes between eye drops.  Please allow 24 to 48 hours when requesting refills, and call BEFORE you run out of  drops.  Restriction on Activities:  - You may develop a slight headache following the treatment. If desired, a pain reliever such as Ibuprofen, Advil, or Motrin may be used.  - It is fine to bathe, read, watch TV, and use the computer.  Symptoms requiring medical attention:  - Sudden onset of increased flashes and/or floaters beyond what you had prior to  leaving the surgery center.  - Persistent or increasing pain in the eye  - Sudden decrease in vision  - Nausea or vomiting  If you have any questions or concerns before or after your surgery, please contact  Dr. Morrow s office at [phone]    What Is Glaucoma?   Glaucoma is an eye disease that can cause blindness. If caught early, it can usually be controlled. But it often has no symptoms, so you need regular eye exams. Glaucoma usually  begins when pressure builds up in the eye. This pressure can damage the optic nerve. The optic nerve sends messages to the brain so you can see. There are two main kinds of glaucoma:  open-angle  and  closed-angle.   Drainage area  The eye is always producing fluid. The eye s drainage areas may become clogged or blocked. Too much fluid stays in the eye. This increases eye pressure.  Optic nerve  Too much pressure in the eye can damage the optic nerve. If damaged, this nerve cannot send the messages to the brain that let you see.  Open-Angle Glaucoma  Open-angle is the most common kind of glaucoma. It occurs slowly as people age. The drainage area in the eye becomes clogged. Not enough fluid drains from the eye, so pressure slowly builds up. This causes gradual loss of side (peripheral) vision. You may not even notice changes until much of your vision is lost.   Closed-Angle Glaucoma  Closed-angle glaucoma is less common than open-angle. It usually comes on quickly. The drainage area in the eye suddenly becomes completely blocked. Eye pressure builds rapidly. You may notice blurred vision and rainbow halos around lights. You may also have headaches, nausea, vomiting, and severe pain. If not treated right away, blindness can occur quickly.    5292-0960 [facility], [address] [place] [address], [place] [address]. All rights reserved. This information is not intended as a substitute for professional medical care. Always follow your healthcare professional's instructions.  Latanoprost Ophthalmic drops, solution  What is this medicine?  LATANOPROST (la TA sophia prost) is used in the eye to treat open angle glaucoma and high pressure in the eye.  This medicine may be used for other purposes; ask your health care provider or pharmacist if you have questions.  What should I tell my health care provider before I take this medicine?  They need to know if you have any of these conditions:    closed angle glaucoma    eye injury,  infection, or swelling    wear contact lenses    an unusual or allergic reaction to latanoprost or other medicines, foods, dyes, or preservatives    pregnant or trying to get pregnant    breast-feeding  How should I use this medicine?  This medicine is only for use in the eye. Do not take by mouth. Follow the directions on the prescription label. Wash hands before and after use. Tilt the head back slightly and pull down the lower lid with the index finger to form a pouch. Try not to touch the tip of the dropper to your eye or into the pouch. Squeeze the prescribed number of drops into the pouch and gently close the eyes for 1 to 2 minutes. Do not blink. Use your doses at regular intervals. Do not use you medicine more often than directed. If you are using another eye product, wait at least 5 minutes between use of this medicine and the other eye product.  Talk to your pediatrician regarding the use of this medicine in children. Special care may be needed.  Overdosage: If you think you have taken too much of this medicine contact a poison control center or emergency room at once.  NOTE: This medicine is only for you. Do not share this medicine with others.  What if I miss a dose?  If you miss a dose, use it as soon as you can. If it is almost time for your next dose, use only that dose. Do not use double or extra doses.  What may interact with this medicine?    bromfenac    eye drops that contain thimerosal (a preservative)  This list may not describe all possible interactions. Give your health care provider a list of all the medicines, herbs, non-prescription drugs, or dietary supplements you use. Also tell them if you smoke, drink alcohol, or use illegal drugs. Some items may interact with your medicine.  What should I watch for while using this medicine?  Visit your doctor or health care professional for regular checks on your progress. Report any serious side effects right away. Stop using this medicine if your  eyes get swollen, painful, or have a discharge, and see your doctor or health care professional as soon as you can.  This medicine may cause your eye, eyelashes, and eyelids to change color. Tell your doctor or health care professional if this happens. If only one eye is being treated with with this medicine, a difference may develop between the treated and untreated eye in eyelash length, darkness or thickness, and/or color changes of the eyelid skin or iris. These changes may be permanent.  If you wear contact lenses, take them out before placing drops in the eye. Contact lenses may be put back in 15 minutes after putting the drops in your eyes.  Wear dark glasses if this medicine makes your eyes more sensitive to light.  What side effects may I notice from receiving this medicine?  Side effects that you should report to your doctor or health care professional as soon as possible:    allergic reactions like skin rash, itching or hives, swelling of the face, lips, or tongue    changes in vision    redness, blistering, peeling or loosening of the skin, including inside the mouth    swollen or infected eyes or eyelids  Side effects that usually do not require medical attention (report to your doctor or health care professional if they continue or are bothersome):    burning, stinging, or discomfort immediately after using the solution    changes in eye, eyelash, or eyelid color    dry eyes    increased flow of tears    sensitivity of the eyes to light  This list may not describe all possible side effects. Call your doctor for medical advice about side effects. You may report side effects to FDA at 9-503-FDA-8093.  Where should I keep my medicine?  Keep out of the reach of children.  Store unopened bottle in the refrigerator at 2 to 8 degrees C (36 to 46 degrees F). Once opened, the container may be stored at room temperature up to 25 degrees C (77 degrees F) for up to 6 weeks. Protect from light. Throw away any  unused medicine after the expiration date.  NOTE:This sheet is a summary. It may not cover all possible information. If you have questions about this medicine, talk to your doctor, pharmacist, or health care provider. Copyright  2012 Gold Standard

## 2017-09-05 DIAGNOSIS — E55.9 VITAMIN D DEFICIENCY DISEASE: ICD-10-CM

## 2017-09-05 NOTE — TELEPHONE ENCOUNTER
vitamin D (ERGOCALCIFEROL) 99781 UNIT capsule      Last Written Prescription Date: 07/03/17  Last Fill Quantity: 8,  # refills: 0   Last Office Visit with [facility], [facility] or [facility] prescribing provider: 07/03/17                                               [facility]

## 2017-09-07 RX ORDER — ERGOCALCIFEROL 1.25 MG/1
CAPSULE, LIQUID FILLED ORAL
Qty: 8 CAPSULE | Refills: 0 | Status: SHIPPED | OUTPATIENT
Start: 2017-09-07 | End: 2018-08-07

## 2017-11-19 ENCOUNTER — HEALTH MAINTENANCE LETTER (OUTPATIENT)
Age: 62
End: 2017-11-19

## 2018-08-07 ENCOUNTER — OFFICE VISIT (OUTPATIENT)
Dept: FAMILY MEDICINE | Facility: CLINIC | Age: 63
End: 2018-08-07
Payer: COMMERCIAL

## 2018-08-07 VITALS
OXYGEN SATURATION: 100 % | HEIGHT: 58 IN | HEART RATE: 63 BPM | WEIGHT: 142 LBS | DIASTOLIC BLOOD PRESSURE: 84 MMHG | BODY MASS INDEX: 29.81 KG/M2 | RESPIRATION RATE: 20 BRPM | TEMPERATURE: 98.4 F | SYSTOLIC BLOOD PRESSURE: 198 MMHG

## 2018-08-07 DIAGNOSIS — E78.5 HYPERLIPIDEMIA LDL GOAL <130: ICD-10-CM

## 2018-08-07 DIAGNOSIS — I10 HYPERTENSION GOAL BP (BLOOD PRESSURE) < 140/90: Primary | ICD-10-CM

## 2018-08-07 DIAGNOSIS — E55.9 VITAMIN D DEFICIENCY: ICD-10-CM

## 2018-08-07 DIAGNOSIS — Z12.11 SCREEN FOR COLON CANCER: ICD-10-CM

## 2018-08-07 DIAGNOSIS — Z12.31 VISIT FOR SCREENING MAMMOGRAM: ICD-10-CM

## 2018-08-07 DIAGNOSIS — R35.0 URINARY FREQUENCY: ICD-10-CM

## 2018-08-07 DIAGNOSIS — Z78.0 ASYMPTOMATIC POSTMENOPAUSAL STATUS: ICD-10-CM

## 2018-08-07 LAB
ALT SERPL W P-5'-P-CCNC: 18 U/L (ref 0–50)
ANION GAP SERPL CALCULATED.3IONS-SCNC: 9 MMOL/L (ref 3–14)
BUN SERPL-MCNC: 11 MG/DL (ref 7–30)
CALCIUM SERPL-MCNC: 8.8 MG/DL (ref 8.5–10.1)
CHLORIDE SERPL-SCNC: 101 MMOL/L (ref 94–109)
CHOLEST SERPL-MCNC: 285 MG/DL
CO2 SERPL-SCNC: 29 MMOL/L (ref 20–32)
CREAT SERPL-MCNC: 0.77 MG/DL (ref 0.52–1.04)
CREAT UR-MCNC: 75 MG/DL
GFR SERPL CREATININE-BSD FRML MDRD: 75 ML/MIN/1.7M2
GLUCOSE SERPL-MCNC: 82 MG/DL (ref 70–99)
HDLC SERPL-MCNC: 65 MG/DL
LDLC SERPL CALC-MCNC: 194 MG/DL
MICROALBUMIN UR-MCNC: 6 MG/L
MICROALBUMIN/CREAT UR: 8.52 MG/G CR (ref 0–25)
NONHDLC SERPL-MCNC: 220 MG/DL
POTASSIUM SERPL-SCNC: 3.7 MMOL/L (ref 3.4–5.3)
SODIUM SERPL-SCNC: 139 MMOL/L (ref 133–144)
TRIGL SERPL-MCNC: 132 MG/DL

## 2018-08-07 PROCEDURE — 84460 ALANINE AMINO (ALT) (SGPT): CPT | Performed by: NURSE PRACTITIONER

## 2018-08-07 PROCEDURE — 99214 OFFICE O/P EST MOD 30 MIN: CPT | Performed by: NURSE PRACTITIONER

## 2018-08-07 PROCEDURE — 36415 COLL VENOUS BLD VENIPUNCTURE: CPT | Performed by: NURSE PRACTITIONER

## 2018-08-07 PROCEDURE — 80048 BASIC METABOLIC PNL TOTAL CA: CPT | Performed by: NURSE PRACTITIONER

## 2018-08-07 PROCEDURE — 82306 VITAMIN D 25 HYDROXY: CPT | Performed by: NURSE PRACTITIONER

## 2018-08-07 PROCEDURE — 82043 UR ALBUMIN QUANTITATIVE: CPT | Performed by: NURSE PRACTITIONER

## 2018-08-07 PROCEDURE — 80061 LIPID PANEL: CPT | Performed by: NURSE PRACTITIONER

## 2018-08-07 RX ORDER — ATORVASTATIN CALCIUM 40 MG/1
40 TABLET, FILM COATED ORAL AT BEDTIME
Qty: 90 TABLET | Refills: 3 | Status: SHIPPED | OUTPATIENT
Start: 2018-08-07 | End: 2019-08-22

## 2018-08-07 RX ORDER — AMLODIPINE BESYLATE 5 MG/1
5 TABLET ORAL DAILY
Qty: 30 TABLET | Refills: 1 | Status: SHIPPED | OUTPATIENT
Start: 2018-08-07 | End: 2019-08-22

## 2018-08-07 RX ORDER — LOSARTAN POTASSIUM 50 MG/1
50 TABLET ORAL DAILY
Qty: 30 TABLET | Refills: 1 | Status: SHIPPED | OUTPATIENT
Start: 2018-08-07 | End: 2019-08-22

## 2018-08-07 RX ORDER — OXYBUTYNIN CHLORIDE 10 MG/1
10 TABLET, EXTENDED RELEASE ORAL DAILY
Qty: 90 TABLET | Refills: 3 | Status: SHIPPED | OUTPATIENT
Start: 2018-08-07 | End: 2019-08-22

## 2018-08-07 ASSESSMENT — PAIN SCALES - GENERAL: PAINLEVEL: MODERATE PAIN (5)

## 2018-08-07 NOTE — LETTER
August 10, 2018      Allison Kerr  [address] [place] [address] [place]   [facility] [address]      Ms. Kerr,   Your lab results were normal, including vitamin D. Please let us know if you have any questions.   Thank you for allowing us to participate in your care.   DARCIE Wu CNP      Resulted Orders   Lipid panel reflex to direct LDL Fasting   Result Value Ref Range    Cholesterol 285 (H) <200 mg/dL      Comment:      Desirable:       <200 mg/dl    Triglycerides 132 <150 mg/dL      Comment:      Fasting specimen    HDL Cholesterol 65 >49 mg/dL    LDL Cholesterol Calculated 194 (H) <100 mg/dL      Comment:      Above desirable:  100-129 mg/dl  Borderline High:  130-159 mg/dL  High:             160-189 mg/dL  Very high:       >189 mg/dl      Non HDL Cholesterol 220 (H) <130 mg/dL      Comment:      Above Desirable:  130-159 mg/dl  Borderline high:  160-189 mg/dl  High:             190-219 mg/dl  Very high:       >219 mg/dl     Basic metabolic panel  (Ca, Cl, CO2, Creat, Gluc, K, Na, BUN)   Result Value Ref Range    Sodium 139 133 - 144 mmol/L    Potassium 3.7 3.4 - 5.3 mmol/L    Chloride 101 94 - 109 mmol/L    Carbon Dioxide 29 20 - 32 mmol/L    Anion Gap 9 3 - 14 mmol/L    Glucose 82 70 - 99 mg/dL      Comment:      Fasting specimen    Urea Nitrogen 11 7 - 30 mg/dL    Creatinine 0.77 0.52 - 1.04 mg/dL    GFR Estimate 75 >60 mL/min/1.7m2      Comment:      Non  GFR Calc    GFR Estimate If Black >90 >60 mL/min/1.7m2      Comment:       GFR Calc    Calcium 8.8 8.5 - 10.1 mg/dL   Albumin Random Urine Quantitative with Creat Ratio   Result Value Ref Range    Creatinine Urine 75 mg/dL    Albumin Urine mg/L 6 mg/L    Albumin Urine mg/g Cr 8.52 0 - 25 mg/g Cr   Vitamin D Deficiency   Result Value Ref Range    Vitamin D Deficiency screening 24 20 - 75 ug/L      Comment:      Season, race, dietary intake, and treatment affect the concentration of   25-hydroxy-Vitamin D. Values may decrease  during winter months and increase   during summer months. Values 20-29 ug/L may indicate Vitamin D insufficiency   and values <20 ug/L may indicate Vitamin D deficiency.  Vitamin D determination is routinely performed by an immunoassay specific for   25 hydroxyvitamin D3.  If an individual is on vitamin D2 (ergocalciferol)   supplementation, please specify 25 OH vitamin D2 and D3 level determination by   LCMSMS test [id number].     ALT   Result Value Ref Range    ALT 18 0 - 50 U/L

## 2018-08-07 NOTE — MR AVS SNAPSHOT
After Visit Summary   8/7/2018    Allison Kerr    MRN: [id number]           Patient Information     Date Of Birth          1955        Visit Information        Provider Department      8/7/2018 10:00 AM Radha Smith APRN CNP [facility]        Today's Diagnoses     Hypertension goal BP (blood pressure) < 140/90    -  1    Screen for colon cancer        Asymptomatic postmenopausal status        Visit for screening mammogram        Vitamin D deficiency        Hyperlipidemia LDL goal <130        Urinary frequency          Care Instructions    Please schedule with optometrist here at [facility]    Phone numbers to schedule mammogram  -[facility] [phone]    Please schedule DEXA scan    Return to clinic in 3-4 weeks for recheck, sooner if needed        Preventive Health Recommendations  Female Ages 65 +    Yearly exam:     See your health care provider every year in order to  o Review health changes.   o Discuss preventive care.    o Review your medicines if your doctor has prescribed any.      You no longer need a yearly Pap test unless you've had an abnormal Pap test in the past 10 years. If you have vaginal symptoms, such as bleeding or discharge, be sure to talk with your provider about a Pap test.      Every 1 to 2 years, have a mammogram.  If you are over 69, talk with your health care provider about whether or not you want to continue having screening mammograms.      Every 10 years, have a colonoscopy. Or, have a yearly FIT test (stool test). These exams will check for colon cancer.       Have a cholesterol test every 5 years, or more often if your doctor advises it.       Have a diabetes test (fasting glucose) every three years. If you are at risk for diabetes, you should have this test more often.       At age 65, have a bone density scan (DEXA) to check for osteoporosis (brittle bone disease).    Shots:    Get a flu shot each year.    Get a tetanus shot  every 10 years.    Talk to your doctor about your pneumonia vaccines. There are now two you should receive - Pneumovax (PPSV 23) and Prevnar (PCV 13).    Talk to your pharmacist about the shingles vaccine.    Talk to your doctor about the hepatitis B vaccine.    Nutrition:     Eat at least 5 servings of fruits and vegetables each day.      Eat whole-grain bread, whole-wheat pasta and brown rice instead of white grains and rice.      Get adequate about Calcium and Vitamin D.     Lifestyle    Exercise at least 150 minutes a week (30 minutes a day, 5 days a week). This will help you control your weight and prevent disease.      Limit alcohol to one drink per day.      No smoking.       Wear sunscreen to prevent skin cancer.       See your dentist twice a year for an exam and cleaning.      See your eye doctor every 1 to 2 years to screen for conditions such as glaucoma, macular degeneration, cataracts, etc           Follow-ups after your visit        Follow-up notes from your care team     Return in about 4 weeks (around 9/4/2018) for BP Recheck.      Future tests that were ordered for you today     Open Future Orders        Priority Expected Expires Ordered    DEXA HIP/PELVIS/SPINE - Future Routine  8/7/2019 8/7/2018    MA SCREENING DIGITAL BILAT - Future  (s+30) Routine  8/7/2019 8/7/2018    Fecal colorectal cancer screen FIT - Future (S+30) Routine 8/28/2018 9/6/2018 8/7/2018            Who to contact     If you have questions or need follow up information about today's clinic visit or your schedule please contact [facility] directly at [phone].  Normal or non-critical lab and imaging results will be communicated to you by MyChart, letter or phone within 4 business days after the clinic has received the results. If you do not hear from us within 7 days, please contact the clinic through MyChart or phone. If you have a critical or abnormal lab result, we will notify you by phone as soon as  "possible.  Submit refill requests through [employer] or call your pharmacy and they will forward the refill request to us. Please allow 3 business days for your refill to be completed.          Additional Information About Your Visit        [employer] Information     [employer] lets you send messages to your doctor, view your test results, renew your prescriptions, schedule appointments and more. To sign up, go to [email or web address] . Click on \"Log in\" on the left side of the screen, which will take you to the Welcome page. Then click on \"Sign up Now\" on the right side of the page.     You will be asked to enter the access code listed below, as well as some personal information. Please follow the directions to create your username and password.     Your access code is: [email or web address]  Expires: 2018 10:31 AM     Your access code will  in 90 days. If you need help or a new code, please call your [place] clinic or [phone].        Care EveryWhere ID     This is your Care EveryWhere ID. This could be used by other organizations to access your [place] medical records  [id number]        Your Vitals Were     Pulse Temperature Respirations Height Last Period Pulse Oximetry    63 98.4  F (36.9  C) (Oral) 20 4' 10.25\" (1.48 m) (LMP Unknown) 100%    Breastfeeding? BMI (Body Mass Index)                No 29.42 kg/m2           Blood Pressure from Last 3 Encounters:   18 198/84   17 (!) 168/99   16 152/82    Weight from Last 3 Encounters:   18 142 lb (64.4 kg)   17 137 lb (62.1 kg)   16 139 lb 3.2 oz (63.1 kg)              We Performed the Following     Albumin Random Urine Quantitative with Creat Ratio     ALT     Basic metabolic panel  (Ca, Cl, CO2, Creat, Gluc, K, Na, BUN)     Lipid panel reflex to direct LDL Fasting     Vitamin D Deficiency          Today's Medication Changes          These changes are accurate as of 18 10:31 AM.  If you have any questions, ask your nurse " or doctor.               Start taking these medicines.        Dose/Directions    amLODIPine 5 MG tablet   Commonly known as:  NORVASC   Used for:  Hypertension goal BP (blood pressure) < 140/90   Started by:  Radha Smith APRN CNP        Dose:  5 mg   Take 1 tablet (5 mg) by mouth daily   Quantity:  30 tablet   Refills:  1       losartan 50 MG tablet   Commonly known as:  COZAAR   Used for:  Hypertension goal BP (blood pressure) < 140/90   Started by:  Radha Smith APRN CNP        Dose:  50 mg   Take 1 tablet (50 mg) by mouth daily   Quantity:  30 tablet   Refills:  1         These medicines have changed or have updated prescriptions.        Dose/Directions    atorvastatin 40 MG tablet   Commonly known as:  LIPITOR   This may have changed:  when to take this   Used for:  Hyperlipidemia LDL goal <130   Changed by:  Radha Smith APRN CNP        Dose:  40 mg   Take 1 tablet (40 mg) by mouth At Bedtime   Quantity:  90 tablet   Refills:  3            Where to get your medicines      These medications were sent to [facility] - [place], [place] - [address]  [address], [facility] [address]     Phone:  [phone]     amLODIPine 5 MG tablet    atorvastatin 40 MG tablet    losartan 50 MG tablet    oxybutynin 10 MG 24 hr tablet                Primary Care Provider Office Phone # Fax #    Elena Jorgensen [phone]       [address]  [facility] [address]        Equal Access to Services     [facility] AH: Hadii aad ku hadasho Soomaali, waaxda luqadaha, qaybta kaalmada adeegyada, waxay trevorin hayruth marley. So [facility] [phone].    ATENCIÓN: Si habla español, tiene a joseph disposición servicios gratuitos de asistencia lingüística. Llame al [phone].    We comply with applicable federal civil rights laws and [place] laws. We do not discriminate on the basis of race, color, national origin, age, disability, sex, sexual  orientation, or gender identity.            Thank you!     Thank you for choosing [facility]  for your care. Our goal is always to provide you with excellent care. Hearing back from our patients is one way we can continue to improve our services. Please take a few minutes to complete the written survey that you may receive in the mail after your visit with us. Thank you!             Your Updated Medication List - Protect others around you: Learn how to safely use, store and throw away your medicines at www.disposemymeds.org.          This list is accurate as of 8/7/18 10:31 AM.  Always use your most recent med list.                   Brand Name Dispense Instructions for use Diagnosis    amLODIPine 5 MG tablet    NORVASC    30 tablet    Take 1 tablet (5 mg) by mouth daily    Hypertension goal BP (blood pressure) < 140/90       atorvastatin 40 MG tablet    LIPITOR    90 tablet    Take 1 tablet (40 mg) by mouth At Bedtime    Hyperlipidemia LDL goal <130       latanoprost 0.005 % ophthalmic solution    XALATAN    1 Bottle    Place 1 drop into both eyes At Bedtime    Glaucoma suspect, bilateral       losartan 50 MG tablet    COZAAR    30 tablet    Take 1 tablet (50 mg) by mouth daily    Hypertension goal BP (blood pressure) < 140/90       oxybutynin 10 MG 24 hr tablet    DITROPAN XL    90 tablet    Take 1 tablet (10 mg) by mouth daily    Urinary frequency

## 2018-08-07 NOTE — PATIENT INSTRUCTIONS
Please schedule with [facility]ometrist here at [facility]    Phone numbers to schedule mammogram  -[facility] [phone]    Please schedule DEXA scan    Return to clinic in 3-4 weeks for recheck, sooner if needed        Preventive Health Recommendations  Female Ages 65 +    Yearly exam:     See your health care provider every year in order to  o Review health changes.   o Discuss preventive care.    o Review your medicines if your doctor has prescribed any.      You no longer need a yearly Pap test unless you've had an abnormal Pap test in the past 10 years. If you have vaginal symptoms, such as bleeding or discharge, be sure to talk with your provider about a Pap test.      Every 1 to 2 years, have a mammogram.  If you are over 69, talk with your health care provider about whether or not you want to continue having screening mammograms.      Every 10 years, have a colonoscopy. Or, have a yearly FIT test (stool test). These exams will check for colon cancer.       Have a cholesterol test every 5 years, or more often if your doctor advises it.       Have a diabetes test (fasting glucose) every three years. If you are at risk for diabetes, you should have this test more often.       At age 65, have a bone density scan (DEXA) to check for osteoporosis (brittle bone disease).    Shots:    Get a flu shot each year.    Get a tetanus shot every 10 years.    Talk to your doctor about your pneumonia vaccines. There are now two you should receive - Pneumovax (PPSV 23) and Prevnar (PCV 13).    Talk to your pharmacist about the shingles vaccine.    Talk to your doctor about the hepatitis B vaccine.    Nutrition:     Eat at least 5 servings of fruits and vegetables each day.      Eat whole-grain bread, whole-wheat pasta and brown rice instead of white grains and rice.      Get adequate about Calcium and Vitamin D.     Lifestyle    Exercise at least 150 minutes a week (30 minutes a day, 5 days a week). This will help you  control your weight and prevent disease.      Limit alcohol to one drink per day.      No smoking.       Wear sunscreen to prevent skin cancer.       See your dentist twice a year for an exam and cleaning.      See your eye doctor every 1 to 2 years to screen for conditions such as glaucoma, macular degeneration, cataracts, etc

## 2018-08-07 NOTE — PROGRESS NOTES
SUBJECTIVE:   Allison Kerr is a 63 year old female who presents to clinic today for the following health issues:      Hypertension Follow-up      Outpatient blood pressures are not being checked.    Low Salt Diet: not monitoring salt      Amount of exercise or physical activity: None    Problems taking medications regularly: Yes,  Lost insurance and ran out of medications    Medication side effects: none    Diet: regular (no restrictions)        Pleasant 63 year old female presents for follow up of her chronic disease problems. She has not taken her BP medications in well over a month as she lost insurance and ran out. She now has insurance again and needs refills. No chest pain, shortness of breath, palpitations, LE edema. She went to the emergency department in May 2018 and was given Norvasc 5 mg and losartan 50 mg.    Requesting refill of Vit D. Last lab over 1 year ago. Agreeable to testing today.    Not taking Lipitor because ran out. Fasting today.    Needs refills of oxybutynin for urinary frequency/urgency/dribbling. Tolerated well when she was taking without side effects.    Agreeable to scheduling health maintenance including mammogram and DEXA as well as doing her FIT test.    Problem list and histories reviewed & adjusted, as indicated.  Additional history: as documented    Patient Active Problem List   Diagnosis     Hyperlipidemia LDL goal <130     Advanced directives, counseling/discussion     Hypertension goal BP (blood pressure) < 140/90     Vitamin D deficiency     Overweight     Urinary frequency     Past Surgical History:   Procedure Laterality Date     CATARACT IOL, RT/LT       HC ECP WITH CATARACT SURGERY  2011    both eyes       Social History   Substance Use Topics     Smoking status: Never Smoker     Smokeless tobacco: Never Used     Alcohol use No     Family History   Problem Relation Age of Onset     Hypertension Mother      Cerebrovascular Disease Mother      Hypertension Father       "    Current Outpatient Prescriptions   Medication Sig Dispense Refill     amLODIPine (NORVASC) 5 MG tablet Take 1 tablet (5 mg) by mouth daily 30 tablet 1     atorvastatin (LIPITOR) 40 MG tablet Take 1 tablet (40 mg) by mouth At Bedtime 90 tablet 3     losartan (COZAAR) 50 MG tablet Take 1 tablet (50 mg) by mouth daily 30 tablet 1     oxybutynin (DITROPAN XL) 10 MG 24 hr tablet Take 1 tablet (10 mg) by mouth daily 90 tablet 3     latanoprost (XALATAN) 0.005 % ophthalmic solution Place 1 drop into both eyes At Bedtime (Patient not taking: Reported on 8/7/2018) 1 Bottle 3     [DISCONTINUED] amLODIPine (NORVASC) 10 MG tablet Take 1 tablet (10 mg) by mouth daily (Patient not taking: Reported on 8/7/2018) 90 tablet 3     [DISCONTINUED] atorvastatin (LIPITOR) 40 MG tablet Take 1 tablet (40 mg) by mouth daily (Patient not taking: Reported on 8/7/2018) 90 tablet 3     Allergies   Allergen Reactions     Ace Inhibitors Cough     Lisinopril Cough     BP Readings from Last 3 Encounters:   08/07/18 198/84   07/03/17 (!) 168/99   08/26/16 152/82    Wt Readings from Last 3 Encounters:   08/07/18 142 lb (64.4 kg)   07/03/17 137 lb (62.1 kg)   08/26/16 139 lb 3.2 oz (63.1 kg)                  Labs reviewed in EPIC    Reviewed and updated as needed this visit by clinical staff  Tobacco  Allergies  Meds  Problems  Med Hx  Surg Hx  Fam Hx  Soc Hx        Reviewed and updated as needed this visit by Provider  Allergies  Meds  Problems         ROS:  Constitutional, HEENT, cardiovascular, pulmonary, GI, , musculoskeletal, neuro, skin, endocrine and psych systems are negative, except as otherwise noted.    OBJECTIVE:     /84  Pulse 63  Temp 98.4  F (36.9  C) (Oral)  Resp 20  Ht 4' 10.25\" (1.48 m)  Wt 142 lb (64.4 kg)  LMP  (LMP Unknown)  SpO2 100%  Breastfeeding? No  BMI 29.42 kg/m2  Body mass index is 29.42 kg/(m^2).  GENERAL: healthy, alert and no distress  EYES: Eyes grossly normal to inspection, PERRL and " conjunctivae and sclerae normal  HENT: ear canals and TM's normal, nose and mouth without ulcers or lesions  NECK: no adenopathy, no asymmetry, masses, or scars and thyroid normal to palpation  RESP: lungs clear to auscultation - no rales, rhonchi or wheezes  CV: regular rate and rhythm, normal S1 S2, no S3 or S4, no murmur, click or rub, no peripheral edema and peripheral pulses strong  ABDOMEN: soft, nontender, no hepatosplenomegaly, no masses and bowel sounds normal  MS: no gross musculoskeletal defects noted, no edema  SKIN: no suspicious lesions or rashes  NEURO: Normal strength and tone, mentation intact and speech normal  PSYCH: mentation appears normal, affect normal/bright    Diagnostic Test Results:  No results found for this or any previous visit (from the past 24 hour(s)).    ASSESSMENT/PLAN:     1. Hypertension goal BP (blood pressure) < 140/90  I explained the risks of leaving BP untreated and the importance of taking her BP medications as prescribed daily. Will refill the medications she started in the emergency department at her last healthcare visit and will have her follow up with her primary care provider or me in 3-4 weeks for recheck and possible medication adjustment. I also asked her to return in 1 week for ancillary visit BP check which she declined. She states she will just return to see provider in 3-4 weeks.  - Basic metabolic panel  (Ca, Cl, CO2, Creat, Gluc, K, Na, BUN)  - Albumin Random Urine Quantitative with Creat Ratio  - amLODIPine (NORVASC) 5 MG tablet; Take 1 tablet (5 mg) by mouth daily  Dispense: 30 tablet; Refill: 1  - losartan (COZAAR) 50 MG tablet; Take 1 tablet (50 mg) by mouth daily  Dispense: 30 tablet; Refill: 1    2. Screen for colon cancer  ordered  - Fecal colorectal cancer screen FIT - Future (S+30); Future    3. Asymptomatic postmenopausal status  ordered  - DEXA HIP/PELVIS/SPINE - Future; Future    4. Visit for screening mammogram  ordered  - MA SCREENING DIGITAL  BILAT - Future  (s+30); Future    5. Vitamin D deficiency  Will test. Further plan pending lab result  - Vitamin D Deficiency    6. Hyperlipidemia LDL goal <130  Off medication for at least 1 month. Checking lipids today and refilled lipitor  - Lipid panel reflex to direct LDL Fasting  - atorvastatin (LIPITOR) 40 MG tablet; Take 1 tablet (40 mg) by mouth At Bedtime  Dispense: 90 tablet; Refill: 3  - ALT    7. Urinary frequency  Refilled oxybutynin. Tolerating wel.   - oxybutynin (DITROPAN XL) 10 MG 24 hr tablet; Take 1 tablet (10 mg) by mouth daily  Dispense: 90 tablet; Refill: 3    She requested optometry referral. She will make appointment before leaving clinic today.    See Patient Instructions      Return to clinic in 3-4 weeks for recheck, sooner if needed      The benefits, risks and potential side effects were discussed in detail. Black box warnings discussed as relevant. All patient questions were answered. The patient was instructed to follow up immediately if any adverse reactions develop.    Patient verbalizes understanding and agrees with plan of care. Patient stable for discharge.      DARCIE Wu [facility]

## 2018-08-08 LAB — DEPRECATED CALCIDIOL+CALCIFEROL SERPL-MC: 24 UG/L (ref 20–75)

## 2018-08-09 DIAGNOSIS — Z12.11 SCREEN FOR COLON CANCER: ICD-10-CM

## 2018-08-09 PROCEDURE — 82274 ASSAY TEST FOR BLOOD FECAL: CPT | Performed by: NURSE PRACTITIONER

## 2018-08-09 NOTE — LETTER
August 10, 2018      Allison Kerr  [address] [place] [address] [place]   [facility] [address]        ,   Your lab results were normal - please repeat in 1 year to check for colon cancer. Please let us know if you have any questions.   Thank you for allowing us to participate in your care.   DARCIE Wu CNP    Resulted Orders   Fecal colorectal cancer screen FIT - Future (S+30)   Result Value Ref Range    Occult Blood Scn FIT Negative NEG^Negative

## 2018-08-10 ENCOUNTER — RADIANT APPOINTMENT (OUTPATIENT)
Dept: MAMMOGRAPHY | Facility: CLINIC | Age: 63
End: 2018-08-10
Attending: NURSE PRACTITIONER
Payer: COMMERCIAL

## 2018-08-10 DIAGNOSIS — Z12.31 VISIT FOR SCREENING MAMMOGRAM: ICD-10-CM

## 2018-08-10 LAB — HEMOCCULT STL QL IA: NEGATIVE

## 2018-08-10 PROCEDURE — 77067 SCR MAMMO BI INCL CAD: CPT | Performed by: RADIOLOGY

## 2018-08-10 NOTE — PROGRESS NOTES
Please send letter:    Ms. Kerr,  Your lab results were normal, including vitamin D. Please let us know if you have any questions.  Thank you for allowing us to participate in your care.  DARCIE Wu CNP

## 2018-08-10 NOTE — PROGRESS NOTES
Please send letter:    ,  Your lab results were normal - please repeat in 1 year to check for colon cancer. Please let us know if you have any questions.  Thank you for allowing us to participate in your care.  DARCIE Wu CNP

## 2018-11-04 ENCOUNTER — TRANSFERRED RECORDS (OUTPATIENT)
Dept: HEALTH INFORMATION MANAGEMENT | Facility: CLINIC | Age: 63
End: 2018-11-04

## 2019-04-21 ENCOUNTER — NURSE TRIAGE (OUTPATIENT)
Dept: NURSING | Facility: CLINIC | Age: 64
End: 2019-04-21

## 2019-04-21 NOTE — TELEPHONE ENCOUNTER
Pt requests non-urgent dental appt. Explained our clinics do not have dental. Gave pt three dental referral numbers. Informed pt these are likely closed today for Sun/holiday but will be open tomorrow. Diana Monge RN/FNA      Additional Information    Negative: [1] Caller is not with the adult (patient) AND [2] reporting urgent symptoms    Negative: Lab result questions    Negative: Medication questions    Negative: Caller cannot be reached by phone    Negative: Caller has already spoken to PCP or another triager    Negative: RN needs further essential information from caller in order to complete triage    Negative: Requesting regular office appointment    Negative: [1] Caller requesting NON-URGENT health information AND [2] PCP's office is the best resource    Negative: Health Information question, no triage required and triager able to answer question    General information question, no triage required and triager able to answer question    Protocols used: IN[facility] ONLY [facility]

## 2019-08-22 ENCOUNTER — OFFICE VISIT (OUTPATIENT)
Dept: FAMILY MEDICINE | Facility: CLINIC | Age: 64
End: 2019-08-22
Payer: COMMERCIAL

## 2019-08-22 ENCOUNTER — ANCILLARY PROCEDURE (OUTPATIENT)
Dept: GENERAL RADIOLOGY | Facility: CLINIC | Age: 64
End: 2019-08-22
Attending: PHYSICIAN ASSISTANT
Payer: COMMERCIAL

## 2019-08-22 VITALS
HEART RATE: 68 BPM | OXYGEN SATURATION: 100 % | HEIGHT: 58 IN | RESPIRATION RATE: 20 BRPM | WEIGHT: 137 LBS | SYSTOLIC BLOOD PRESSURE: 172 MMHG | BODY MASS INDEX: 28.76 KG/M2 | TEMPERATURE: 98.6 F | DIASTOLIC BLOOD PRESSURE: 84 MMHG

## 2019-08-22 DIAGNOSIS — Z22.7 TB LUNG, LATENT: ICD-10-CM

## 2019-08-22 DIAGNOSIS — Z11.1 SCREENING EXAMINATION FOR PULMONARY TUBERCULOSIS: ICD-10-CM

## 2019-08-22 DIAGNOSIS — E78.5 HYPERLIPIDEMIA LDL GOAL <130: ICD-10-CM

## 2019-08-22 DIAGNOSIS — R35.0 URINARY FREQUENCY: ICD-10-CM

## 2019-08-22 DIAGNOSIS — Z12.11 SPECIAL SCREENING FOR MALIGNANT NEOPLASMS, COLON: ICD-10-CM

## 2019-08-22 DIAGNOSIS — Z11.1 SCREENING EXAMINATION FOR PULMONARY TUBERCULOSIS: Primary | ICD-10-CM

## 2019-08-22 DIAGNOSIS — I10 HYPERTENSION GOAL BP (BLOOD PRESSURE) < 140/90: ICD-10-CM

## 2019-08-22 LAB
ALBUMIN SERPL-MCNC: 3.9 G/DL (ref 3.4–5)
ALP SERPL-CCNC: 83 U/L (ref 40–150)
ALT SERPL W P-5'-P-CCNC: 16 U/L (ref 0–50)
ANION GAP SERPL CALCULATED.3IONS-SCNC: 6 MMOL/L (ref 3–14)
AST SERPL W P-5'-P-CCNC: 16 U/L (ref 0–45)
BASOPHILS # BLD AUTO: 0 10E9/L (ref 0–0.2)
BASOPHILS NFR BLD AUTO: 0.4 %
BILIRUB SERPL-MCNC: 0.8 MG/DL (ref 0.2–1.3)
BUN SERPL-MCNC: 10 MG/DL (ref 7–30)
CALCIUM SERPL-MCNC: 9.4 MG/DL (ref 8.5–10.1)
CHLORIDE SERPL-SCNC: 101 MMOL/L (ref 94–109)
CHOLEST SERPL-MCNC: 302 MG/DL
CO2 SERPL-SCNC: 29 MMOL/L (ref 20–32)
CREAT SERPL-MCNC: 0.68 MG/DL (ref 0.52–1.04)
CREAT UR-MCNC: 24 MG/DL
DIFFERENTIAL METHOD BLD: NORMAL
EOSINOPHIL # BLD AUTO: 0.1 10E9/L (ref 0–0.7)
EOSINOPHIL NFR BLD AUTO: 1.6 %
ERYTHROCYTE [DISTWIDTH] IN BLOOD BY AUTOMATED COUNT: 12.9 % (ref 10–15)
GFR SERPL CREATININE-BSD FRML MDRD: >90 ML/MIN/{1.73_M2}
GLUCOSE SERPL-MCNC: 75 MG/DL (ref 70–99)
HCT VFR BLD AUTO: 41.5 % (ref 35–47)
HDLC SERPL-MCNC: 66 MG/DL
HGB BLD-MCNC: 13.9 G/DL (ref 11.7–15.7)
LDLC SERPL CALC-MCNC: 206 MG/DL
LYMPHOCYTES # BLD AUTO: 2.8 10E9/L (ref 0.8–5.3)
LYMPHOCYTES NFR BLD AUTO: 35.4 %
MCH RBC QN AUTO: 27.5 PG (ref 26.5–33)
MCHC RBC AUTO-ENTMCNC: 33.5 G/DL (ref 31.5–36.5)
MCV RBC AUTO: 82 FL (ref 78–100)
MICROALBUMIN UR-MCNC: <5 MG/L
MICROALBUMIN/CREAT UR: NORMAL MG/G CR (ref 0–25)
MONOCYTES # BLD AUTO: 0.5 10E9/L (ref 0–1.3)
MONOCYTES NFR BLD AUTO: 6.5 %
NEUTROPHILS # BLD AUTO: 4.4 10E9/L (ref 1.6–8.3)
NEUTROPHILS NFR BLD AUTO: 56.1 %
NONHDLC SERPL-MCNC: 236 MG/DL
PLATELET # BLD AUTO: 173 10E9/L (ref 150–450)
POTASSIUM SERPL-SCNC: 3.6 MMOL/L (ref 3.4–5.3)
PROT SERPL-MCNC: 8.3 G/DL (ref 6.8–8.8)
RBC # BLD AUTO: 5.05 10E12/L (ref 3.8–5.2)
SODIUM SERPL-SCNC: 136 MMOL/L (ref 133–144)
TRIGL SERPL-MCNC: 149 MG/DL
WBC # BLD AUTO: 7.9 10E9/L (ref 4–11)

## 2019-08-22 PROCEDURE — 85025 COMPLETE CBC W/AUTO DIFF WBC: CPT | Performed by: PHYSICIAN ASSISTANT

## 2019-08-22 PROCEDURE — 36415 COLL VENOUS BLD VENIPUNCTURE: CPT | Performed by: PHYSICIAN ASSISTANT

## 2019-08-22 PROCEDURE — 99214 OFFICE O/P EST MOD 30 MIN: CPT | Performed by: PHYSICIAN ASSISTANT

## 2019-08-22 PROCEDURE — 71046 X-RAY EXAM CHEST 2 VIEWS: CPT

## 2019-08-22 PROCEDURE — 80061 LIPID PANEL: CPT | Performed by: PHYSICIAN ASSISTANT

## 2019-08-22 PROCEDURE — 82043 UR ALBUMIN QUANTITATIVE: CPT | Performed by: PHYSICIAN ASSISTANT

## 2019-08-22 PROCEDURE — 80053 COMPREHEN METABOLIC PANEL: CPT | Performed by: PHYSICIAN ASSISTANT

## 2019-08-22 RX ORDER — ATORVASTATIN CALCIUM 40 MG/1
40 TABLET, FILM COATED ORAL AT BEDTIME
Qty: 90 TABLET | Refills: 0 | Status: SHIPPED | OUTPATIENT
Start: 2019-08-22

## 2019-08-22 RX ORDER — OXYBUTYNIN CHLORIDE 10 MG/1
10 TABLET, EXTENDED RELEASE ORAL DAILY
Qty: 90 TABLET | Refills: 0 | Status: SHIPPED | OUTPATIENT
Start: 2019-08-22

## 2019-08-22 RX ORDER — AMLODIPINE BESYLATE 5 MG/1
5 TABLET ORAL DAILY
Qty: 30 TABLET | Refills: 0 | Status: SHIPPED | OUTPATIENT
Start: 2019-08-22

## 2019-08-22 RX ORDER — LOSARTAN POTASSIUM 50 MG/1
50 TABLET ORAL DAILY
Qty: 90 TABLET | Refills: 0 | Status: SHIPPED | OUTPATIENT
Start: 2019-08-22

## 2019-08-22 ASSESSMENT — MIFFLIN-ST. JEOR: SCORE: 1055.15

## 2019-08-22 ASSESSMENT — PAIN SCALES - GENERAL: PAINLEVEL: NO PAIN (0)

## 2019-08-22 NOTE — LETTER
August 26, 2019        Allison Kerr  [address]   [facility] [address]        Ms. Kerr,    Your LDL (bad cholesterol) was above goal.  Genetics, diet, weight and low exercise levels can contribute to this.    Elevated LDL cholesterol  increases a person's risk for heart and vascular disease. You need to take your statin medicine and to recheck fasting labs yearly. Maintaining a healthy diet with lean proteins, whole grains and healthy fats such as olive oil as well as regular exercise and maintaining an appropriate weight all contribute to healthier cholesterol levels.  The rest of your labs were normal. We are awaiting the stool sample.       Sincerely,    Boyd Calles PA-C/juan        Resulted Orders   Comprehensive metabolic panel   Result Value Ref Range    Sodium 136 133 - 144 mmol/L    Potassium 3.6 3.4 - 5.3 mmol/L    Chloride 101 94 - 109 mmol/L    Carbon Dioxide 29 20 - 32 mmol/L    Anion Gap 6 3 - 14 mmol/L    Glucose 75 70 - 99 mg/dL      Comment:      Fasting specimen    Urea Nitrogen 10 7 - 30 mg/dL    Creatinine 0.68 0.52 - 1.04 mg/dL    GFR Estimate >90 >60 mL/min/[1.73_m2]      Comment:      Non  GFR Calc  Starting 12/18/2018, serum creatinine based estimated GFR (eGFR) will be   calculated using the Chronic Kidney Disease Epidemiology Collaboration   (CKD-EPI) equation.      GFR Estimate If Black >90 >60 mL/min/[1.73_m2]      Comment:       GFR Calc  Starting 12/18/2018, serum creatinine based estimated GFR (eGFR) will be   calculated using the Chronic Kidney Disease Epidemiology Collaboration   (CKD-EPI) equation.      Calcium 9.4 8.5 - 10.1 mg/dL    Bilirubin Total 0.8 0.2 - 1.3 mg/dL    Albumin 3.9 3.4 - 5.0 g/dL    Protein Total 8.3 6.8 - 8.8 g/dL    Alkaline Phosphatase 83 40 - 150 U/L    ALT 16 0 - 50 U/L    AST 16 0 - 45 U/L   CBC with platelets differential   Result Value Ref Range    WBC 7.9 4.0 - 11.0 10e9/L    RBC Count 5.05 3.8 - 5.2 10e12/L     Hemoglobin 13.9 11.7 - 15.7 g/dL    Hematocrit 41.5 35.0 - 47.0 %    MCV 82 78 - 100 fl    MCH 27.5 26.5 - 33.0 pg    MCHC 33.5 31.5 - 36.5 g/dL    RDW 12.9 10.0 - 15.0 %    Platelet Count 173 150 - 450 10e9/L    % Neutrophils 56.1 %    % Lymphocytes 35.4 %    % Monocytes 6.5 %    % Eosinophils 1.6 %    % Basophils 0.4 %    Absolute Neutrophil 4.4 1.6 - 8.3 10e9/L    Absolute Lymphocytes 2.8 0.8 - 5.3 10e9/L    Absolute Monocytes 0.5 0.0 - 1.3 10e9/L    Absolute Eosinophils 0.1 0.0 - 0.7 10e9/L    Absolute Basophils 0.0 0.0 - 0.2 10e9/L    Diff Method Automated Method    Lipid Profile   Result Value Ref Range    Cholesterol 302 (H) <200 mg/dL      Comment:      Desirable:       <200 mg/dl    Triglycerides 149 <150 mg/dL      Comment:      Fasting specimen    HDL Cholesterol 66 >49 mg/dL    LDL Cholesterol Calculated 206 (H) <100 mg/dL      Comment:      Above desirable:  100-129 mg/dl  Borderline High:  130-159 mg/dL  High:             160-189 mg/dL  Very high:       >189 mg/dl      Non HDL Cholesterol 236 (H) <130 mg/dL      Comment:      Above Desirable:  130-159 mg/dl  Borderline high:  160-189 mg/dl  High:             190-219 mg/dl  Very high:       >219 mg/dl     Albumin Random Urine Quantitative with Creat Ratio   Result Value Ref Range    Creatinine Urine 24 mg/dL    Albumin Urine mg/L <5 mg/L    Albumin Urine mg/g Cr Unable to calculate due to low value 0 - 25 mg/g Cr

## 2019-08-22 NOTE — LETTER
[address] [address]  Phone: [phone]    August 22, 2019        Allison Kerr  [address]   [facility] [address]          To whom it may concern:    RE: Alliosn Kerr    Patient was seen and treated today at our clinic.  Given her history she is not eligible for TB screening by Mantoux or Quantiferon Gold.  Her Chest X-ray was normal and she has no signs or symptoms of active TB.      Please contact me for questions or concerns.      Sincerely,        KIM Flores

## 2019-08-22 NOTE — PROGRESS NOTES
"Subjective     Allison ROOSEVELT Kerr is a 64 year old female who presents to clinic today for the following health issues:    HPI   Tuberculosis screening for work - complete form.   Hx + mantoux and tx'd for latent TB    No sympotms      Just stopped taking all her meds b/c she felt ok.         Allergies   Allergen Reactions     Ace Inhibitors Cough     Lisinopril Cough       Past Medical History:   Diagnosis Date     Cataract      Hypertension        Patient Active Problem List   Diagnosis     Hyperlipidemia LDL goal <130     Advanced directives, counseling/discussion     Hypertension goal BP (blood pressure) < 140/90     Vitamin D deficiency     Overweight     Urinary frequency     TB lung, latent         Current Outpatient Medications on File Prior to Visit:  latanoprost (XALATAN) 0.005 % ophthalmic solution Place 1 drop into both eyes At Bedtime (Patient not taking: Reported on 8/7/2018)     No current facility-administered medications on file prior to visit.     Social History     Tobacco Use     Smoking status: Never Smoker     Smokeless tobacco: Never Used   Substance Use Topics     Alcohol use: No     Drug use: No       Family History   Problem Relation Age of Onset     Hypertension Mother      Cerebrovascular Disease Mother      Hypertension Father        ROS:  General: negative for fever  Resp: negative forcough   CV:htn hx as above   overactive bladder    OBJECTIVE:  BP (!) 172/84 (BP Location: Left arm, Patient Position: Chair, Cuff Size: Adult Regular)   Pulse 68   Temp 98.6  F (37  C) (Oral)   Resp 20   Ht 1.48 m (4' 10.25\")   Wt 62.1 kg (137 lb)   LMP  (LMP Unknown)   SpO2 100%   BMI 28.39 kg/m     General:   awake, alert, and cooperative.  NAD.   Head: Normocephalic, atraumatic.  Eyes: Conjunctiva clear,   Heart: Regular rate and rhythm. No murmur.  Lungs: Chest is clear; no wheezes or rales.   Neuro: Alert and oriented - normal speech.    ASSESSMENT:well appearing, no s/s active TB disease    " ICD-10-CM    1. Screening examination for pulmonary tuberculosis Z11.1 XR Chest 2 Views   2. Hypertension goal BP (blood pressure) < 140/90 I10 losartan (COZAAR) 50 MG tablet     amLODIPine (NORVASC) 5 MG tablet     Comprehensive metabolic panel     CBC with platelets differential     JUST IN CASE     Albumin Random Urine Quantitative with Creat Ratio   3. Hyperlipidemia LDL goal <130 E78.5 atorvastatin (LIPITOR) 40 MG tablet     Lipid Profile   4. Urinary frequency R35.0 oxybutynin ER (DITROPAN XL) 10 MG 24 hr tablet   5. TB lung, latent R76.11 XR Chest 2 Views   6. Special screening for malignant neoplasms, colon Z12.11 Fecal colorectal cancer screen FIT       PLAN: dsicussed risk of untreated HTn and hyperlipids  Follow up:  4 weeks  Advised about symptoms which might herald more serious problems.

## 2019-08-22 NOTE — PATIENT INSTRUCTIONS
At [facility], we strive to deliver an exceptional experience to you, every time we see you.  If you receive a survey in the mail, please send us back your thoughts. We really do value your feedback.    Based on your medical history, these are the current health maintenance/preventive care services that you are due for (some may have been done at this visit.)  Health Maintenance Due   Topic Date Due     DEXA  1955     ZOSTER IMMUNIZATION (1 of 2) 05/27/2003     ADVANCE CARE PLANNING  11/08/2017     FALL RISK ASSESSMENT  05/27/2018     PNEUMOCOCCAL IMMUNIZATION 65+ LOW/MEDIUM RISK (1 of 2 - PCV13) 05/27/2018     MEDICARE ANNUAL WELLNESS VISIT  07/03/2018     EYE EXAM  08/14/2018     PHQ-2  01/01/2019     FIT  08/09/2019         Suggested websites for health information:  Www.[employer].DoubleVerify : Up to date and easily searchable information on multiple topics.  Www.[employer].gov : medication info, interactive tutorials, watch real surgeries online  Www.familydoctor.org : good info from the Academy of Family Physicians  Www.cdc.gov : public health info, travel advisories, epidemics (H1N1)  Www.aap.org : children's health info, normal development, vaccinations  Www.health.[facility].[place].us : [place] dept of health, public health issues in [place], N1N1    Your care team:                            Family Medicine Internal Medicine   MD Edin Aleman MD Shantel Branch-Fleming, MD Katya Georgiev PA-C Nam Ho, MD Pediatrics   PATRIZIA Brambila, ERIN SPRAGUE CNP   MD Ariana Lara MD Deborah Mielke, MD Kim Thein, APRN CNP      Clinic hours: Monday - Thursday 7 am-7 pm; Fridays 7 am-5 pm.   Urgent care: Monday - Friday 11 am-9 pm; Saturday and Sunday 9 am-5 pm.  Pharmacy : Monday -Thursday 8 am-8 pm; Friday 8 am-6 pm; Saturday and Sunday 9 am-5 pm.     Clinic: [phone]   Pharmacy: [phone]      Patient Education     Discharge  Instructions for High Blood Pressure (Hypertension)  You have been diagnosed with high blood pressure (also called hypertension). This means the force of blood against your artery walls is too strong. It also means your heart is working hard to move blood. High blood pressure usually has no symptoms, but over time, it can cause serious health problems. High blood pressure raises your risk for heart attack, stroke, heart failure, kidney disease, and blindness. With help from your doctor, you can manage your blood pressure and protect your health.  Blood pressure measurements are given as 2 numbers. Systolic blood pressure is the upper number. This is the pressure when the heart contracts. Diastolic blood pressure is the lower number. This is the pressure when the heart relaxes between beats.  Blood pressure is categorized as normal, elevated, or stage 1 or stage 2 high blood pressure:    Normal blood pressure is systolic of less than 120 and diastolic of less than 80 (120/80)    Elevated blood pressure is systolic of 120 to 129 and diastolic less than 80    Stage 1 high blood pressure is systolic is 130 to 139 or diastolic between 80 to 89    Stage 2 high blood pressure is when systolic is 140 or higher or the diastolic is 90 or higher  Taking medicine    Learn to take your own blood pressure. Keep a record of your results. Ask your doctor which readings mean that you need medical attention.    Take your blood pressure medicine exactly as directed. Don t skip doses. Missing doses can cause your blood pressure to get out of control.    If you do miss a dose (or doses) check with your healthcare provider about what to do.    Don't take medicines that contain heart stimulants, including over-the-counter medicines. Check for warnings about high blood pressure on the label. Ask the pharmacist before purchasing something you haven't used before    Check with your doctor or pharmacist before taking a decongestant. Some  "decongestants can worsen high blood pressure.  Lifestyle changes    Maintain a healthy weight. Get help to lose any extra pounds.    Cut back on salt.  ? Limit canned, dried, packaged, and fast foods.  ? Don t add salt to your food at the table.  ? Season foods with herbs instead of salt when you cook.  ? Request no added salt when you go to a restaurant.  ? The American Heart Association (AHA) says the \"ideal\" amount of sodium is no more than 1,500 mg a day.  But because Americans eat so much salt, you can make a positive change by cutting back to even 2,400 mg of sodium a day.     Follow the DASH (Dietary Approaches to Stop Hypertension) eating plan. This plan recommends vegetables, fruits, whole gains, and other heart healthy foods.    Begin an exercise program. Ask your healthcare provider how to get started. The AHA recommends aerobic exercise 3 to 4 times a week for an average of 40 minutes at a time, with your provider's approval. Simple activities like walking or gardening can help.    Break the smoking habit. Enroll in a stop-smoking program to improve your chances of success. Ask your healthcare provider about programs and medicines to help you stop smoking.    Limit drinks that contain caffeine such as coffee, black or green tea, and cola to 2 per day.    Never take stimulants such as amphetamines or cocaine. These drugs can be deadly for someone with high blood pressure.    Control your stress. Learn ways to manage stress.    Limit alcohol to no more than 1 drink a day for women and 2 drinks a day for men.  Follow-up care  Make a follow-up appointment as directed.  When to call your healthcare provider  Call your healthcare provider right away if you have any of the following:    Chest pain or shortness of breath (call 911)    Moderate to severe headache    Weakness in the muscles of your face, arms, or legs    Trouble speaking    Extreme drowsiness    Confusion    Fainting or dizziness    Pulsating or " rushing sound in your ears    Unexplained nosebleed    Weakness, tingling, or numbness of your face, arms, or legs    Change in vision    Blood pressure measured at home that is greater than 180/110   Date Last Reviewed: 4/27/2016 2000-2018 [employer]. [address] [place] [address]. All rights reserved. This information is not intended as a substitute for professional medical care. Always follow your healthcare professional's instructions.

## 2020-04-23 ENCOUNTER — TELEPHONE (OUTPATIENT)
Dept: FAMILY MEDICINE | Facility: CLINIC | Age: 65
End: 2020-04-23

## 2020-04-23 NOTE — LETTER
[address]  [address]  [phone]    May 12, 2020      Allison Kerr  [address]   [facility] [address]      Dear Allison,      We see that you are due for your Annual Medicare Video Visit and a curbside blood pressure check.  Please call [phone] to schedule this appointment.    Thank you,    [facility]

## 2020-04-23 NOTE — TELEPHONE ENCOUNTER
Panel Management Review   One phone call and send letter if unable to reach them or [employer] message and send letter if not read after 2 weeks (You will get a message to your inbasket)      BP Readings from Last 1 Encounters:   08/22/19 (!) 172/84        Health Maintenance Due   Topic Date Due     DEXA  1955     ZOSTER IMMUNIZATION (1 of 2) 05/27/2003     ADVANCE CARE PLANNING  11/08/2017     FALL RISK ASSESSMENT  05/27/2018     PNEUMOCOCCAL IMMUNIZATION 65+ LOW/MEDIUM RISK (1 of 2 - PCV13) 05/27/2018     MEDICARE ANNUAL WELLNESS VISIT  07/03/2018     EYE EXAM  08/14/2018     COLORECTAL CANCER SCREENING  08/09/2019     INFLUENZA VACCINE (1) 09/01/2019     PHQ-2  01/01/2020        Fail List measure: Blood pressure        Patient is due/failing the following:   BP CHECK    Action needed:   Patient needs curbside bp check, Video Medicare Annual Wellness      Type of outreach:    Phone, left message for patient to call back.     Questions for provider review:    None                                                                                                                                   Ekta Velez

## 2021-05-22 ENCOUNTER — TELEPHONE (OUTPATIENT)
Dept: FAMILY MEDICINE | Facility: CLINIC | Age: 66
End: 2021-05-22

## 2021-05-22 NOTE — TELEPHONE ENCOUNTER
Patient Quality Outreach      Summary:    Patient has the following on her problem list/HM:   Hypertension   Last three blood pressure readings:  BP Readings from Last 3 Encounters:   08/22/19 (!) 172/84   08/07/18 198/84   07/03/17 (!) 168/99     Blood pressure: Failed    HTN Guidelines:  ? 139/89     Patient is due/failing the following:   Hypertension follow-up visit    Type of outreach:        Questions for provider review:    None                                                                                                                                          Chart routed to Care Team.

## 2021-07-05 NOTE — TELEPHONE ENCOUNTER
Spoke to patient and patient refused to schedule stating that she's not ready and will call when ready to schedule an appointment.  .Schuyler Hancock Patient Registration

## 2023-07-06 NOTE — PATIENT INSTRUCTIONS
How to contact your care team: [phone] Pharmacy [phone]   PATRIZIA ESPINOZA MD KATYA GEORGIEV, PA-C CHRIS JONES, PA-C NAM HO, MD JONATHAN BATES, MD ARVIN VOCAL, MD    Clinic hours M-Th 7am-7pm Fri 7am-5pm.   Urgent care M-F 11am-9pm  Sat/Sun 9am-5pm.   Pharmacy   Mon-Th:  8:00am-8pm   Fri:  8:00am-6:00pm  Sat/Sun  8:00am-5:00 pm   You are due for your annual mammogram. Please call and schedule your appointment at a time and location that is good for you.    [facility] is available for screening mammographs every other Monday 4:15-5:15, Tuesday 8-10:45, Wednesday 11-12, Friday 3:15-4:15, and every other Saturday 9:15-12:30.    [address] [place] [address]  [phone]   24 hour Mammography scheduling  [phone]    [facility] is available M,W, Th 7:15 am to 5 pm, Tuesday 7:15 am to 4 pm, and Fridays 7:15 to 4:30 pm.     [facility]  [address] [place] [address]  [phone]     Preventive Health Recommendations  Female Ages 50 - 64    Yearly exam: See your health care provider every year in order to  o Review health changes.   o Discuss preventive care.    o Review your medicines if your doctor has prescribed any.      Get a Pap test every three years (unless you have an abnormal result and your provider advises testing more often).    If you get Pap tests with HPV test, you only need to test every 5 years, unless you have an abnormal result.     You do not need a Pap test if your uterus was removed (hysterectomy) and you have not had cancer.    You should be tested each year for STDs (sexually transmitted diseases) if you're at risk.     Have a mammogram every 1 to 2 years.    Have a colonoscopy at age 50, or have a yearly FIT test (stool test). These exams screen for colon cancer.      Have a cholesterol test every 5 years, or more often if  Juani's office will call you with the biopsy findings. Call Dr. Dasha Miles if there are any GI concerns.  [phone] advised.    Have a diabetes test (fasting glucose) every three years. If you are at risk for diabetes, you should have this test more often.     If you are at risk for osteoporosis (brittle bone disease), think about having a bone density scan (DEXA).    Shots: Get a flu shot each year. Get a tetanus shot every 10 years.    Nutrition:     Eat at least 5 servings of fruits and vegetables each day.    Eat whole-grain bread, whole-wheat pasta and brown rice instead of white grains and rice.    Talk to your provider about Calcium and Vitamin D.     Lifestyle    Exercise at least 150 minutes a week (30 minutes a day, 5 days a week). This will help you control your weight and prevent disease.    Limit alcohol to one drink per day.    No smoking.     Wear sunscreen to prevent skin cancer.     See your dentist every six months for an exam and cleaning.    See your eye doctor every 1 to 2 years.